# Patient Record
Sex: FEMALE | Race: WHITE | NOT HISPANIC OR LATINO | Employment: FULL TIME | URBAN - METROPOLITAN AREA
[De-identification: names, ages, dates, MRNs, and addresses within clinical notes are randomized per-mention and may not be internally consistent; named-entity substitution may affect disease eponyms.]

---

## 2019-04-21 ENCOUNTER — ANESTHESIA EVENT (OUTPATIENT)
Dept: SURGERY | Facility: HOSPITAL | Age: 54
DRG: 580 | End: 2019-04-21
Payer: COMMERCIAL

## 2019-04-21 ENCOUNTER — HOSPITAL ENCOUNTER (INPATIENT)
Facility: HOSPITAL | Age: 54
LOS: 4 days | Discharge: HOME HEALTH | DRG: 580 | End: 2019-04-25
Attending: EMERGENCY MEDICINE | Admitting: EMERGENCY MEDICINE
Payer: COMMERCIAL

## 2019-04-21 ENCOUNTER — ANESTHESIA (OUTPATIENT)
Dept: SURGERY | Facility: HOSPITAL | Age: 54
DRG: 580 | End: 2019-04-21
Payer: COMMERCIAL

## 2019-04-21 DIAGNOSIS — L03.012 CELLULITIS OF FINGER OF LEFT HAND: ICD-10-CM

## 2019-04-21 DIAGNOSIS — S61.052A CAT BITE OF LEFT THUMB, INITIAL ENCOUNTER: ICD-10-CM

## 2019-04-21 DIAGNOSIS — L02.512 ABSCESS OF LEFT HAND: ICD-10-CM

## 2019-04-21 DIAGNOSIS — M65.9 FLEXOR TENOSYNOVITIS OF THUMB: Primary | ICD-10-CM
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
## 2019-04-21 DIAGNOSIS — L03.90 CELLULITIS, UNSPECIFIED CELLULITIS SITE: ICD-10-CM

## 2019-04-21 LAB
ALBUMIN SERPL BCP-MCNC: 3.9 G/DL (ref 3.5–5.2)
ALP SERPL-CCNC: 68 U/L (ref 55–135)
ALT SERPL W/O P-5'-P-CCNC: 28 U/L (ref 10–44)
ANION GAP SERPL CALC-SCNC: 11 MMOL/L (ref 8–16)
ANION GAP SERPL CALC-SCNC: 13 MMOL/L (ref 8–16)
AST SERPL-CCNC: 32 U/L (ref 10–40)
BASOPHILS # BLD AUTO: 0.05 K/UL (ref 0–0.2)
BASOPHILS # BLD AUTO: 0.05 K/UL (ref 0–0.2)
BASOPHILS NFR BLD: 0.3 % (ref 0–1.9)
BASOPHILS NFR BLD: 0.3 % (ref 0–1.9)
BILIRUB SERPL-MCNC: 0.7 MG/DL (ref 0.1–1)
BUN SERPL-MCNC: 15 MG/DL (ref 6–20)
BUN SERPL-MCNC: 16 MG/DL (ref 6–20)
CALCIUM SERPL-MCNC: 8.9 MG/DL (ref 8.7–10.5)
CALCIUM SERPL-MCNC: 9.5 MG/DL (ref 8.7–10.5)
CHLORIDE SERPL-SCNC: 106 MMOL/L (ref 95–110)
CHLORIDE SERPL-SCNC: 106 MMOL/L (ref 95–110)
CO2 SERPL-SCNC: 22 MMOL/L (ref 23–29)
CO2 SERPL-SCNC: 24 MMOL/L (ref 23–29)
CREAT SERPL-MCNC: 0.8 MG/DL (ref 0.5–1.4)
CREAT SERPL-MCNC: 0.8 MG/DL (ref 0.5–1.4)
CRP SERPL-MCNC: 15.2 MG/L (ref 0–8.2)
DIFFERENTIAL METHOD: ABNORMAL
DIFFERENTIAL METHOD: ABNORMAL
EOSINOPHIL # BLD AUTO: 0.1 K/UL (ref 0–0.5)
EOSINOPHIL # BLD AUTO: 0.2 K/UL (ref 0–0.5)
EOSINOPHIL NFR BLD: 0.5 % (ref 0–8)
EOSINOPHIL NFR BLD: 0.8 % (ref 0–8)
ERYTHROCYTE [DISTWIDTH] IN BLOOD BY AUTOMATED COUNT: 12.3 % (ref 11.5–14.5)
ERYTHROCYTE [DISTWIDTH] IN BLOOD BY AUTOMATED COUNT: 12.5 % (ref 11.5–14.5)
ERYTHROCYTE [SEDIMENTATION RATE] IN BLOOD BY WESTERGREN METHOD: 16 MM/HR (ref 0–36)
EST. GFR  (AFRICAN AMERICAN): >60 ML/MIN/1.73 M^2
EST. GFR  (AFRICAN AMERICAN): >60 ML/MIN/1.73 M^2
EST. GFR  (NON AFRICAN AMERICAN): >60 ML/MIN/1.73 M^2
EST. GFR  (NON AFRICAN AMERICAN): >60 ML/MIN/1.73 M^2
GLUCOSE SERPL-MCNC: 103 MG/DL (ref 70–110)
GLUCOSE SERPL-MCNC: 112 MG/DL (ref 70–110)
GRAM STN SPEC: NORMAL
GRAM STN SPEC: NORMAL
HCT VFR BLD AUTO: 40.1 % (ref 37–48.5)
HCT VFR BLD AUTO: 42.4 % (ref 37–48.5)
HGB BLD-MCNC: 13.2 G/DL (ref 12–16)
HGB BLD-MCNC: 13.8 G/DL (ref 12–16)
IMM GRANULOCYTES # BLD AUTO: 0.08 K/UL (ref 0–0.04)
IMM GRANULOCYTES # BLD AUTO: 0.1 K/UL (ref 0–0.04)
IMM GRANULOCYTES NFR BLD AUTO: 0.4 % (ref 0–0.5)
IMM GRANULOCYTES NFR BLD AUTO: 0.6 % (ref 0–0.5)
LYMPHOCYTES # BLD AUTO: 4.7 K/UL (ref 1–4.8)
LYMPHOCYTES # BLD AUTO: 4.8 K/UL (ref 1–4.8)
LYMPHOCYTES NFR BLD: 26.1 % (ref 18–48)
LYMPHOCYTES NFR BLD: 26.1 % (ref 18–48)
MCH RBC QN AUTO: 30.3 PG (ref 27–31)
MCH RBC QN AUTO: 30.4 PG (ref 27–31)
MCHC RBC AUTO-ENTMCNC: 32.5 G/DL (ref 32–36)
MCHC RBC AUTO-ENTMCNC: 32.9 G/DL (ref 32–36)
MCV RBC AUTO: 92 FL (ref 82–98)
MCV RBC AUTO: 93 FL (ref 82–98)
MONOCYTES # BLD AUTO: 1.1 K/UL (ref 0.3–1)
MONOCYTES # BLD AUTO: 1.3 K/UL (ref 0.3–1)
MONOCYTES NFR BLD: 5.9 % (ref 4–15)
MONOCYTES NFR BLD: 7 % (ref 4–15)
NEUTROPHILS # BLD AUTO: 11.9 K/UL (ref 1.8–7.7)
NEUTROPHILS # BLD AUTO: 12.3 K/UL (ref 1.8–7.7)
NEUTROPHILS NFR BLD: 65.5 % (ref 38–73)
NEUTROPHILS NFR BLD: 66.5 % (ref 38–73)
NRBC BLD-RTO: 0 /100 WBC
NRBC BLD-RTO: 0 /100 WBC
PLATELET # BLD AUTO: 278 K/UL (ref 150–350)
PLATELET # BLD AUTO: 301 K/UL (ref 150–350)
PMV BLD AUTO: 9.5 FL (ref 9.2–12.9)
PMV BLD AUTO: 9.9 FL (ref 9.2–12.9)
POTASSIUM SERPL-SCNC: 3.7 MMOL/L (ref 3.5–5.1)
POTASSIUM SERPL-SCNC: 4 MMOL/L (ref 3.5–5.1)
PROT SERPL-MCNC: 7.3 G/DL (ref 6–8.4)
RBC # BLD AUTO: 4.34 M/UL (ref 4–5.4)
RBC # BLD AUTO: 4.55 M/UL (ref 4–5.4)
SODIUM SERPL-SCNC: 141 MMOL/L (ref 136–145)
SODIUM SERPL-SCNC: 141 MMOL/L (ref 136–145)
WBC # BLD AUTO: 18.08 K/UL (ref 3.9–12.7)
WBC # BLD AUTO: 18.54 K/UL (ref 3.9–12.7)

## 2019-04-21 PROCEDURE — 36415 COLL VENOUS BLD VENIPUNCTURE: CPT

## 2019-04-21 PROCEDURE — 99223 PR INITIAL HOSPITAL CARE,LEVL III: ICD-10-PCS | Mod: ,,, | Performed by: PHYSICIAN ASSISTANT

## 2019-04-21 PROCEDURE — 63600175 PHARM REV CODE 636 W HCPCS: Performed by: PHYSICIAN ASSISTANT

## 2019-04-21 PROCEDURE — 25000003 PHARM REV CODE 250: Performed by: EMERGENCY MEDICINE

## 2019-04-21 PROCEDURE — 64721 CARPAL TUNNEL SURGERY: CPT | Mod: 51,LT,, | Performed by: ORTHOPAEDIC SURGERY

## 2019-04-21 PROCEDURE — 86140 C-REACTIVE PROTEIN: CPT

## 2019-04-21 PROCEDURE — D9220A PRA ANESTHESIA: ICD-10-PCS | Mod: CRNA,,, | Performed by: NURSE ANESTHETIST, CERTIFIED REGISTERED

## 2019-04-21 PROCEDURE — 36000707: Performed by: ORTHOPAEDIC SURGERY

## 2019-04-21 PROCEDURE — 99223 1ST HOSP IP/OBS HIGH 75: CPT | Mod: ,,, | Performed by: PHYSICIAN ASSISTANT

## 2019-04-21 PROCEDURE — 80053 COMPREHEN METABOLIC PANEL: CPT

## 2019-04-21 PROCEDURE — 99291 CRITICAL CARE FIRST HOUR: CPT | Mod: 25

## 2019-04-21 PROCEDURE — 27000221 HC OXYGEN, UP TO 24 HOURS

## 2019-04-21 PROCEDURE — 85025 COMPLETE CBC W/AUTO DIFF WBC: CPT | Mod: 91

## 2019-04-21 PROCEDURE — 85652 RBC SED RATE AUTOMATED: CPT

## 2019-04-21 PROCEDURE — D9220A PRA ANESTHESIA: Mod: ANES,,, | Performed by: ANESTHESIOLOGY

## 2019-04-21 PROCEDURE — D9220A PRA ANESTHESIA: Mod: CRNA,,, | Performed by: NURSE ANESTHETIST, CERTIFIED REGISTERED

## 2019-04-21 PROCEDURE — 87102 FUNGUS ISOLATION CULTURE: CPT

## 2019-04-21 PROCEDURE — 25028 I&D F/ARM&/WRST DP ABSC/HMTM: CPT | Mod: LT,,, | Performed by: ORTHOPAEDIC SURGERY

## 2019-04-21 PROCEDURE — 63600175 PHARM REV CODE 636 W HCPCS: Performed by: NURSE ANESTHETIST, CERTIFIED REGISTERED

## 2019-04-21 PROCEDURE — 37000008 HC ANESTHESIA 1ST 15 MINUTES: Performed by: ORTHOPAEDIC SURGERY

## 2019-04-21 PROCEDURE — 99223 PR INITIAL HOSPITAL CARE,LEVL III: ICD-10-PCS | Mod: ,,, | Performed by: HOSPITALIST

## 2019-04-21 PROCEDURE — 25500020 PHARM REV CODE 255: Performed by: EMERGENCY MEDICINE

## 2019-04-21 PROCEDURE — 64721 PR REVISE MEDIAN N/CARPAL TUNNEL SURG: ICD-10-PCS | Mod: 51,LT,, | Performed by: ORTHOPAEDIC SURGERY

## 2019-04-21 PROCEDURE — 63600175 PHARM REV CODE 636 W HCPCS: Performed by: ANESTHESIOLOGY

## 2019-04-21 PROCEDURE — 71000033 HC RECOVERY, INTIAL HOUR: Performed by: ORTHOPAEDIC SURGERY

## 2019-04-21 PROCEDURE — 71000039 HC RECOVERY, EACH ADD'L HOUR: Performed by: ORTHOPAEDIC SURGERY

## 2019-04-21 PROCEDURE — 99222 1ST HOSP IP/OBS MODERATE 55: CPT | Mod: 57,,, | Performed by: ORTHOPAEDIC SURGERY

## 2019-04-21 PROCEDURE — 96365 THER/PROPH/DIAG IV INF INIT: CPT

## 2019-04-21 PROCEDURE — 26055 PR INCISE FINGER TENDON SHEATH: ICD-10-PCS | Mod: 51,FA,, | Performed by: ORTHOPAEDIC SURGERY

## 2019-04-21 PROCEDURE — A9585 GADOBUTROL INJECTION: HCPCS | Performed by: EMERGENCY MEDICINE

## 2019-04-21 PROCEDURE — 99291 PR CRITICAL CARE, E/M 30-74 MINUTES: ICD-10-PCS | Mod: ,,, | Performed by: EMERGENCY MEDICINE

## 2019-04-21 PROCEDURE — S0028 INJECTION, FAMOTIDINE, 20 MG: HCPCS | Performed by: NURSE ANESTHETIST, CERTIFIED REGISTERED

## 2019-04-21 PROCEDURE — 96375 TX/PRO/DX INJ NEW DRUG ADDON: CPT

## 2019-04-21 PROCEDURE — 36000706: Performed by: ORTHOPAEDIC SURGERY

## 2019-04-21 PROCEDURE — 25000003 PHARM REV CODE 250: Performed by: INTERNAL MEDICINE

## 2019-04-21 PROCEDURE — 94761 N-INVAS EAR/PLS OXIMETRY MLT: CPT

## 2019-04-21 PROCEDURE — 99291 CRITICAL CARE FIRST HOUR: CPT | Mod: ,,, | Performed by: EMERGENCY MEDICINE

## 2019-04-21 PROCEDURE — 26055 INCISE FINGER TENDON SHEATH: CPT | Mod: 51,FA,, | Performed by: ORTHOPAEDIC SURGERY

## 2019-04-21 PROCEDURE — 99222 PR INITIAL HOSPITAL CARE,LEVL II: ICD-10-PCS | Mod: 57,,, | Performed by: ORTHOPAEDIC SURGERY

## 2019-04-21 PROCEDURE — 25000003 PHARM REV CODE 250: Performed by: PHYSICIAN ASSISTANT

## 2019-04-21 PROCEDURE — 63600175 PHARM REV CODE 636 W HCPCS: Performed by: EMERGENCY MEDICINE

## 2019-04-21 PROCEDURE — 80048 BASIC METABOLIC PNL TOTAL CA: CPT

## 2019-04-21 PROCEDURE — 87116 MYCOBACTERIA CULTURE: CPT

## 2019-04-21 PROCEDURE — D9220A PRA ANESTHESIA: ICD-10-PCS | Mod: ANES,,, | Performed by: ANESTHESIOLOGY

## 2019-04-21 PROCEDURE — 37000009 HC ANESTHESIA EA ADD 15 MINS: Performed by: ORTHOPAEDIC SURGERY

## 2019-04-21 PROCEDURE — 87075 CULTR BACTERIA EXCEPT BLOOD: CPT

## 2019-04-21 PROCEDURE — 20600001 HC STEP DOWN PRIVATE ROOM

## 2019-04-21 PROCEDURE — 87205 SMEAR GRAM STAIN: CPT

## 2019-04-21 PROCEDURE — 87206 SMEAR FLUORESCENT/ACID STAI: CPT

## 2019-04-21 PROCEDURE — 25000003 PHARM REV CODE 250: Performed by: NURSE ANESTHETIST, CERTIFIED REGISTERED

## 2019-04-21 PROCEDURE — 87070 CULTURE OTHR SPECIMN AEROBIC: CPT

## 2019-04-21 PROCEDURE — 99223 1ST HOSP IP/OBS HIGH 75: CPT | Mod: ,,, | Performed by: HOSPITALIST

## 2019-04-21 PROCEDURE — 25028 PR INCIS/DRAIN FOREARM DEEP ABSCESS: ICD-10-PCS | Mod: LT,,, | Performed by: ORTHOPAEDIC SURGERY

## 2019-04-21 RX ORDER — DEXAMETHASONE SODIUM PHOSPHATE 4 MG/ML
INJECTION, SOLUTION INTRA-ARTICULAR; INTRALESIONAL; INTRAMUSCULAR; INTRAVENOUS; SOFT TISSUE
Status: DISCONTINUED | OUTPATIENT
Start: 2019-04-21 | End: 2019-04-21

## 2019-04-21 RX ORDER — MIDAZOLAM HYDROCHLORIDE 1 MG/ML
INJECTION, SOLUTION INTRAMUSCULAR; INTRAVENOUS
Status: DISCONTINUED | OUTPATIENT
Start: 2019-04-21 | End: 2019-04-21

## 2019-04-21 RX ORDER — ONDANSETRON 2 MG/ML
INJECTION INTRAMUSCULAR; INTRAVENOUS
Status: DISCONTINUED | OUTPATIENT
Start: 2019-04-21 | End: 2019-04-21

## 2019-04-21 RX ORDER — SODIUM CHLORIDE 9 MG/ML
INJECTION, SOLUTION INTRAVENOUS CONTINUOUS PRN
Status: DISCONTINUED | OUTPATIENT
Start: 2019-04-21 | End: 2019-04-21

## 2019-04-21 RX ORDER — ACETAMINOPHEN 10 MG/ML
INJECTION, SOLUTION INTRAVENOUS
Status: DISCONTINUED | OUTPATIENT
Start: 2019-04-21 | End: 2019-04-21

## 2019-04-21 RX ORDER — ONDANSETRON 8 MG/1
8 TABLET, ORALLY DISINTEGRATING ORAL EVERY 8 HOURS PRN
Status: DISCONTINUED | OUTPATIENT
Start: 2019-04-21 | End: 2019-04-22

## 2019-04-21 RX ORDER — FENTANYL CITRATE 50 UG/ML
25 INJECTION, SOLUTION INTRAMUSCULAR; INTRAVENOUS EVERY 5 MIN PRN
Status: COMPLETED | OUTPATIENT
Start: 2019-04-21 | End: 2019-04-21

## 2019-04-21 RX ORDER — IBUPROFEN 200 MG
24 TABLET ORAL
Status: DISCONTINUED | OUTPATIENT
Start: 2019-04-21 | End: 2019-04-25 | Stop reason: HOSPADM

## 2019-04-21 RX ORDER — RAMELTEON 8 MG/1
8 TABLET ORAL NIGHTLY PRN
Status: DISCONTINUED | OUTPATIENT
Start: 2019-04-21 | End: 2019-04-25 | Stop reason: HOSPADM

## 2019-04-21 RX ORDER — METOCLOPRAMIDE HYDROCHLORIDE 5 MG/ML
INJECTION INTRAMUSCULAR; INTRAVENOUS
Status: DISCONTINUED | OUTPATIENT
Start: 2019-04-21 | End: 2019-04-21

## 2019-04-21 RX ORDER — LIDOCAINE HCL/PF 100 MG/5ML
SYRINGE (ML) INTRAVENOUS
Status: DISCONTINUED | OUTPATIENT
Start: 2019-04-21 | End: 2019-04-21

## 2019-04-21 RX ORDER — AMOXICILLIN 250 MG
1 CAPSULE ORAL 2 TIMES DAILY
Status: DISCONTINUED | OUTPATIENT
Start: 2019-04-21 | End: 2019-04-25 | Stop reason: HOSPADM

## 2019-04-21 RX ORDER — GADOBUTROL 604.72 MG/ML
10 INJECTION INTRAVENOUS
Status: COMPLETED | OUTPATIENT
Start: 2019-04-21 | End: 2019-04-21

## 2019-04-21 RX ORDER — GLUCAGON 1 MG
1 KIT INJECTION
Status: DISCONTINUED | OUTPATIENT
Start: 2019-04-21 | End: 2019-04-25 | Stop reason: HOSPADM

## 2019-04-21 RX ORDER — IPRATROPIUM BROMIDE AND ALBUTEROL SULFATE 2.5; .5 MG/3ML; MG/3ML
3 SOLUTION RESPIRATORY (INHALATION) EVERY 4 HOURS PRN
Status: DISCONTINUED | OUTPATIENT
Start: 2019-04-21 | End: 2019-04-25 | Stop reason: HOSPADM

## 2019-04-21 RX ORDER — METOCLOPRAMIDE HYDROCHLORIDE 5 MG/ML
10 INJECTION INTRAMUSCULAR; INTRAVENOUS EVERY 10 MIN PRN
Status: DISCONTINUED | OUTPATIENT
Start: 2019-04-21 | End: 2019-04-22

## 2019-04-21 RX ORDER — IBUPROFEN 200 MG
16 TABLET ORAL
Status: DISCONTINUED | OUTPATIENT
Start: 2019-04-21 | End: 2019-04-25 | Stop reason: HOSPADM

## 2019-04-21 RX ORDER — SODIUM CHLORIDE 0.9 % (FLUSH) 0.9 %
5 SYRINGE (ML) INJECTION
Status: DISCONTINUED | OUTPATIENT
Start: 2019-04-21 | End: 2019-04-22

## 2019-04-21 RX ORDER — SODIUM CHLORIDE 0.9 % (FLUSH) 0.9 %
10 SYRINGE (ML) INJECTION
Status: DISCONTINUED | OUTPATIENT
Start: 2019-04-21 | End: 2019-04-25 | Stop reason: HOSPADM

## 2019-04-21 RX ORDER — MORPHINE SULFATE ORAL SOLUTION 10 MG/5ML
10 SOLUTION ORAL EVERY 6 HOURS PRN
Status: DISCONTINUED | OUTPATIENT
Start: 2019-04-21 | End: 2019-04-21

## 2019-04-21 RX ORDER — ACETAMINOPHEN 325 MG/1
650 TABLET ORAL EVERY 4 HOURS PRN
Status: DISCONTINUED | OUTPATIENT
Start: 2019-04-21 | End: 2019-04-22

## 2019-04-21 RX ORDER — KETAMINE HYDROCHLORIDE 10 MG/ML
INJECTION, SOLUTION INTRAMUSCULAR; INTRAVENOUS
Status: DISCONTINUED | OUTPATIENT
Start: 2019-04-21 | End: 2019-04-21

## 2019-04-21 RX ORDER — PROPOFOL 10 MG/ML
VIAL (ML) INTRAVENOUS
Status: DISCONTINUED | OUTPATIENT
Start: 2019-04-21 | End: 2019-04-21

## 2019-04-21 RX ORDER — FENTANYL CITRATE 50 UG/ML
INJECTION, SOLUTION INTRAMUSCULAR; INTRAVENOUS
Status: DISCONTINUED | OUTPATIENT
Start: 2019-04-21 | End: 2019-04-21

## 2019-04-21 RX ORDER — KETOROLAC TROMETHAMINE 30 MG/ML
15 INJECTION, SOLUTION INTRAMUSCULAR; INTRAVENOUS EVERY 6 HOURS PRN
Status: DISCONTINUED | OUTPATIENT
Start: 2019-04-21 | End: 2019-04-21

## 2019-04-21 RX ORDER — OXYCODONE HYDROCHLORIDE 5 MG/1
5 TABLET ORAL EVERY 6 HOURS PRN
Status: DISCONTINUED | OUTPATIENT
Start: 2019-04-21 | End: 2019-04-22

## 2019-04-21 RX ORDER — FAMOTIDINE 10 MG/ML
INJECTION INTRAVENOUS
Status: DISCONTINUED | OUTPATIENT
Start: 2019-04-21 | End: 2019-04-21

## 2019-04-21 RX ORDER — MEPERIDINE HYDROCHLORIDE 50 MG/ML
12.5 INJECTION INTRAMUSCULAR; INTRAVENOUS; SUBCUTANEOUS EVERY 10 MIN PRN
Status: DISCONTINUED | OUTPATIENT
Start: 2019-04-21 | End: 2019-04-21

## 2019-04-21 RX ORDER — MORPHINE SULFATE 4 MG/ML
8 INJECTION, SOLUTION INTRAMUSCULAR; INTRAVENOUS
Status: COMPLETED | OUTPATIENT
Start: 2019-04-21 | End: 2019-04-21

## 2019-04-21 RX ORDER — PROMETHAZINE HYDROCHLORIDE 12.5 MG/1
25 TABLET ORAL EVERY 6 HOURS PRN
Status: DISCONTINUED | OUTPATIENT
Start: 2019-04-21 | End: 2019-04-22

## 2019-04-21 RX ORDER — HYDROMORPHONE HYDROCHLORIDE 1 MG/ML
0.2 INJECTION, SOLUTION INTRAMUSCULAR; INTRAVENOUS; SUBCUTANEOUS EVERY 5 MIN PRN
Status: DISCONTINUED | OUTPATIENT
Start: 2019-04-21 | End: 2019-04-22

## 2019-04-21 RX ADMIN — DEXAMETHASONE SODIUM PHOSPHATE 8 MG: 4 INJECTION, SOLUTION INTRAMUSCULAR; INTRAVENOUS at 08:04

## 2019-04-21 RX ADMIN — HYDROMORPHONE HYDROCHLORIDE 0.2 MG: 1 INJECTION, SOLUTION INTRAMUSCULAR; INTRAVENOUS; SUBCUTANEOUS at 10:04

## 2019-04-21 RX ADMIN — AMPICILLIN SODIUM AND SULBACTAM SODIUM 3 G: 2; 1 INJECTION, POWDER, FOR SOLUTION INTRAMUSCULAR; INTRAVENOUS at 02:04

## 2019-04-21 RX ADMIN — ACETAMINOPHEN 650 MG: 325 TABLET ORAL at 04:04

## 2019-04-21 RX ADMIN — FENTANYL CITRATE 25 MCG: 50 INJECTION INTRAMUSCULAR; INTRAVENOUS at 10:04

## 2019-04-21 RX ADMIN — RAMELTEON 8 MG: 8 TABLET, FILM COATED ORAL at 08:04

## 2019-04-21 RX ADMIN — LIDOCAINE HYDROCHLORIDE 100 MG: 20 INJECTION, SOLUTION INTRAVENOUS at 08:04

## 2019-04-21 RX ADMIN — AMPICILLIN SODIUM AND SULBACTAM SODIUM 3 G: 2; 1 INJECTION, POWDER, FOR SOLUTION INTRAMUSCULAR; INTRAVENOUS at 08:04

## 2019-04-21 RX ADMIN — OXYCODONE HYDROCHLORIDE 5 MG: 5 TABLET ORAL at 09:04

## 2019-04-21 RX ADMIN — SENNOSIDES AND DOCUSATE SODIUM 1 TABLET: 8.6; 5 TABLET ORAL at 08:04

## 2019-04-21 RX ADMIN — KETAMINE HYDROCHLORIDE 20 MG: 10 INJECTION, SOLUTION INTRAMUSCULAR; INTRAVENOUS at 08:04

## 2019-04-21 RX ADMIN — ACETAMINOPHEN 650 MG: 325 TABLET ORAL at 11:04

## 2019-04-21 RX ADMIN — FENTANYL CITRATE 50 MCG: 50 INJECTION, SOLUTION INTRAMUSCULAR; INTRAVENOUS at 08:04

## 2019-04-21 RX ADMIN — ONDANSETRON 4 MG: 2 INJECTION INTRAMUSCULAR; INTRAVENOUS at 08:04

## 2019-04-21 RX ADMIN — GADOBUTROL 10 ML: 604.72 INJECTION INTRAVENOUS at 04:04

## 2019-04-21 RX ADMIN — ONDANSETRON 8 MG: 8 TABLET, ORALLY DISINTEGRATING ORAL at 12:04

## 2019-04-21 RX ADMIN — HYDROMORPHONE HYDROCHLORIDE 0.2 MG: 1 INJECTION, SOLUTION INTRAMUSCULAR; INTRAVENOUS; SUBCUTANEOUS at 07:04

## 2019-04-21 RX ADMIN — MIDAZOLAM HYDROCHLORIDE 2 MG: 1 INJECTION, SOLUTION INTRAMUSCULAR; INTRAVENOUS at 08:04

## 2019-04-21 RX ADMIN — MORPHINE SULFATE 10 MG: 10 SOLUTION ORAL at 12:04

## 2019-04-21 RX ADMIN — AMPICILLIN SODIUM AND SULBACTAM SODIUM 3 G: 2; 1 INJECTION, POWDER, FOR SOLUTION INTRAMUSCULAR; INTRAVENOUS at 01:04

## 2019-04-21 RX ADMIN — SODIUM CHLORIDE: 0.9 INJECTION, SOLUTION INTRAVENOUS at 08:04

## 2019-04-21 RX ADMIN — FENTANYL CITRATE 25 MCG: 50 INJECTION INTRAMUSCULAR; INTRAVENOUS at 09:04

## 2019-04-21 RX ADMIN — OXYCODONE HYDROCHLORIDE 5 MG: 5 TABLET ORAL at 04:04

## 2019-04-21 RX ADMIN — PROPOFOL 150 MG: 10 INJECTION, EMULSION INTRAVENOUS at 08:04

## 2019-04-21 RX ADMIN — SENNOSIDES AND DOCUSATE SODIUM 1 TABLET: 8.6; 5 TABLET ORAL at 10:04

## 2019-04-21 RX ADMIN — ACETAMINOPHEN 1000 MG: 10 INJECTION, SOLUTION INTRAVENOUS at 08:04

## 2019-04-21 RX ADMIN — MORPHINE SULFATE 10 MG: 10 SOLUTION ORAL at 05:04

## 2019-04-21 RX ADMIN — FAMOTIDINE 20 MG: 10 INJECTION, SOLUTION INTRAVENOUS at 08:04

## 2019-04-21 RX ADMIN — MORPHINE SULFATE 8 MG: 4 INJECTION INTRAVENOUS at 01:04

## 2019-04-21 RX ADMIN — FENTANYL CITRATE 50 MCG: 50 INJECTION, SOLUTION INTRAMUSCULAR; INTRAVENOUS at 09:04

## 2019-04-21 RX ADMIN — METOCLOPRAMIDE 5 MG: 5 INJECTION, SOLUTION INTRAMUSCULAR; INTRAVENOUS at 08:04

## 2019-04-21 NOTE — NURSING TRANSFER
Nursing Transfer Note      4/21/2019     Transfer To: 729    Transfer via bed    Transfer with IV ABX, IV pole    Transported by transport      Chart send with patient: Yes    Notified: n/a    Patient reassessed at: 4/21/19

## 2019-04-21 NOTE — ASSESSMENT & PLAN NOTE
Geneva Rodriguez is a 53 y.o. female with left hand abscess s/p cat bite. MRI showing large fluid collection in hand.    - To OR today for incision and drainage. Marked and consented. NPO. Continue IVAB.    The risks, benefits and alternatives to surgery were discussed with the patient at great length.  These include bleeding, infection, vessel/nerve damage, pain, numbness, tingling, complex regional pain syndrome, recurrent infection need for further surgery, cartilage damage, DVT, PE, arthritis and death.  Patient states an understanding and wishes to proceed with surgery.   All questions were answered.  No guarantees were implied or stated.  Informed consent was obtained.

## 2019-04-21 NOTE — ANESTHESIA POSTPROCEDURE EVALUATION
Anesthesia Post Evaluation    Patient: Geneva Rodriguez    Procedure(s) Performed: Procedure(s) (LRB):  Incision and Drainage Hand (Left)  RELEASE, CARPAL TUNNEL (Left)  RELEASE, TRIGGER FINGER, THUMB    Final Anesthesia Type: general  Patient location during evaluation: PACU  Patient participation: Yes- Able to Participate  Level of consciousness: awake and alert  Post-procedure vital signs: reviewed and stable  Pain management: pain needs to be addressed  Airway patency: patent  PONV status at discharge: No PONV  Anesthetic complications: no      Cardiovascular status: blood pressure returned to baseline  Respiratory status: unassisted  Hydration status: euvolemic  Follow-up not needed.          Vitals Value Taken Time   /58 4/21/2019 10:16 AM   Temp 36.4 °C (97.5 °F) 4/21/2019  9:35 AM   Pulse 104 4/21/2019 10:17 AM   Resp 20 4/21/2019 10:17 AM   SpO2 89 % 4/21/2019 10:17 AM   Vitals shown include unvalidated device data.      No case tracking events are documented in the log.      Pain/Madi Score: Pain Rating Prior to Med Admin: 6 (4/21/2019 10:15 AM)  Pain Rating Post Med Admin: 6 (4/21/2019 10:15 AM)

## 2019-04-21 NOTE — PLAN OF CARE
Problem: Adult Inpatient Plan of Care  Goal: Plan of Care Review  Outcome: Ongoing (interventions implemented as appropriate)  Plan of care reviewed with liangnet. Pt verbalized understanding of plan of care. Patient AAOX4, VSS. Pt NPO. Pain management maintained. Consents signed for surgery, scheduled for 0730.  Bed locked and low, patient belongings and call light within reach. Will continue to monitor.

## 2019-04-21 NOTE — OP NOTE
DATE OF PROCEDURE:  04/21/2019.    PROCEDURES:  1.  Irrigation and debridement, left hand and thumb.  2.  Carpal tunnel release, left wrist.  3.  A1 pulley release, left thumb.    PRIMARY SURGEON:  Emiliano Martínez M.D.    ASSISTANT SURGEONS:  Niraj Amato M.D. (RES); Dr. Guido Bull; and Dr. Guido Rdz.    PREOPERATIVE DIAGNOSES:  1.  First webspace infection, left hand.  2.  Acute carpal tunnel with abscess, left wrist.  3.  Cat bite, left thumb.    POSTOPERATIVE DIAGNOSES:  1.  First webspace infection, left hand.  2.  Acute carpal tunnel with abscess, left wrist.  3.  Cat bite, left thumb.    ANESTHESIA:  General.    INDICATIONS FOR PROCEDURE:  Ms. Rodriguez is a 53-year-old female who sustained a   cat bite to her left thumb two days ago.  She was seen in the Emergency   Department this morning with signs and symptoms consistent with flexor   tenosynovitis.  An MRI was performed showing a large fluid collection within the   carpal tunnel as well as the flexor tendon sheath of the thumb.  Treatment   options were explained in great detail with the patient.  We elected to proceed   with emergent I and D with exploration.  All consents were signed.  No   guarantees were made.    PROCEDURE IN DETAIL:  On the date of the procedure, the patient arrived to the   preoperative area where consents were verified and surgical site was marked.    She was then taken to the Operative Suite and placed under general anesthesia in   the supine position with all bony prominences well-padded.  A tourniquet was   applied to the left upper arm.  Preoperative antibiotics were then held in   anticipation of cultures.  Timeout was performed verifying the procedure.  All   agreed and we elected to proceed.  The left upper extremity was then prepped and   draped in a sterile fashion.  We began by making a standard carpal tunnel   incision using Boyd's cardinal lines.  The palmaris fascia was swept out of   the way and the  transverse carpal ligament was incised in line with the   incision.  The transverse carpal ligament was then cut using Metzenbaum scissors   proximally and distally and the median nerve was retracted out of harm's way,   and at this time, we found a large pocket of purulent fluid within the flexor   tendon sheath in the deep carpal tunnel compartment.  This was irrigated with   copious amounts of normal saline using an Angiocath and bulb irrigation.  We   then made a standard incision over the proximal palmar crease of the left thumb.    The sensory nerve in the radial sensory branch was then retracted out of   harm's way and the A1 pulley was incised longitudinally.  There was a minimal   amount of fluid within the sheath.  This again was irrigated with an Angiocath.    The wounds were then closed using 3-0 nylon in an interrupted fashion and the   patient was placed in a sterile dressing and a volar slab splint.  The patient   tolerated the procedure well without any complications.    ESTIMATED BLOOD LOSS:  5 mL.    IMPLANTS:  None.    SPECIMENS:  Wound culture sent for culture.    CONDITION:  Stable.    DISPOSITION:  The patient will be remaining on IV antibiotics with Pasteurella   coverage.  She understands that she may need a repeat washout.  Dr. Emiliano Martínez was present for all critical aspects of the procedure.    DICTATED BY:  Niraj Amato M.D. (RES).      MARIANNE/WON  dd: 04/21/2019 09:27:48 (CDT)  td: 04/21/2019 12:43:40 (CDT)  Doc ID   #[id number]  Job ID #[id number]    CC:

## 2019-04-21 NOTE — ANESTHESIA PREPROCEDURE EVALUATION
[facility]  Anesthesia Pre-Operative Evaluation         Patient Name: Geneva Rodriguez  YOB: 1965  MRN: [id number]    SUBJECTIVE:     Pre-operative evaluation for Procedure(s) (LRB):  Incision and Drainage. Hand. Hand table, hand set. 3L NS. (Left)     04/21/2019    Geneva Rodriguez is a 53 y.o. female without significant PMH is being admitted from ED with cat bite cellulitis of left thumb. She is afebrile, but has leukocytosis and elevated CRP. MRI showing large amount of fluid in hand concerning for abscess.    Patient now presents for the above procedure(s).      LDA:   22G PIV R hand     Prev airway: None documented.    Drips: None documented.      Patient Active Problem List   Diagnosis    Cellulitis    Cat bite of left thumb       Review of patient's allergies indicates:  No Known Allergies    Current Inpatient Medications:   ampicillin-sulbactim (UNASYN) IVPB  3 g Intravenous Q6H    senna-docusate 8.6-50 mg  1 tablet Oral BID       No current facility-administered medications on file prior to encounter.      No current outpatient medications on file prior to encounter.       History reviewed. No pertinent surgical history.    Social History     Socioeconomic History    Marital status: Single     Spouse name: Not on file    Number of children: Not on file    Years of education: Not on file    Highest education level: Not on file   Occupational History    Not on file   Social Needs    Financial resource strain: Not on file    Food insecurity:     Worry: Not on file     Inability: Not on file    Transportation needs:     Medical: Not on file     Non-medical: Not on file   Tobacco Use    Smoking status: Never Smoker   Substance and Sexual Activity    Alcohol use: Not on file    Drug use: Not on file    Sexual activity: Not on file   Lifestyle    Physical activity:     Days per week: Not on file     Minutes per session: Not on file    Stress: Not on file    Relationships    Social connections:     Talks on phone: Not on file     Gets together: Not on file     Attends [employer] service: Not on file     Active member of club or organization: Not on file     Attends meetings of clubs or organizations: Not on file     Relationship status: Not on file   Other Topics Concern    Not on file   Social History Narrative    Not on file       OBJECTIVE:     Vital Signs Range (Last 24H):  Temp:  [36.2 °C (97.2 °F)-36.9 °C (98.4 °F)]   Pulse:  []   Resp:  [16-20]   BP: (118-138)/(60-79)   SpO2:  [95 %-99 %]       Significant Labs:  Lab Results   Component Value Date    WBC 18.08 (H) 04/21/2019    HGB 13.2 04/21/2019    HCT 40.1 04/21/2019     04/21/2019    ALT 28 04/21/2019    AST 32 04/21/2019     04/21/2019    K 3.7 04/21/2019     04/21/2019    CREATININE 0.8 04/21/2019    BUN 16 04/21/2019    CO2 24 04/21/2019       Diagnostic Studies: No relevant studies.    EKG: No recent studies available.    2D ECHO:  No results found for this or any previous visit.      ASSESSMENT/PLAN:         Anesthesia Evaluation    I have reviewed the Patient Summary Reports.     I have reviewed the Medications.     Review of Systems  Anesthesia Hx:  No previous Anesthesia  Neg history of prior surgery. Denies Family Hx of Anesthesia complications.    Social:  Non-Smoker    Cardiovascular:   Denies Hypertension.  Denies CAD.       Pulmonary:   Denies COPD.  Denies Asthma.  Denies Recent URI.    Renal/:   Denies Chronic Renal Disease.     Hepatic/GI:   Denies GERD.    Endocrine:   Denies Diabetes.        Physical Exam  General:  Well nourished, Obesity    Airway/Jaw/Neck:  Airway Findings: Mouth Opening: Small, but > 3cm Tongue: Normal  General Airway Assessment: Adult  Mallampati: III  Improves to II with phonation.  TM Distance: 4 - 6 cm  Jaw/Neck Findings:  Neck ROM: Normal ROM  Neck Findings:  Short Neck      Dental:  Dental Findings: In tact     Chest/Lungs:  Chest/Lungs Findings: Clear to auscultation, Normal Respiratory Rate     Heart/Vascular:  Heart Findings: Rate: Normal  Rhythm: Regular Rhythm  Sounds: Normal        Mental Status:  Mental Status Findings:  Cooperative, Alert and Oriented         Anesthesia Plan  Type of Anesthesia, risks & benefits discussed:  Anesthesia Type:  general  Patient's Preference:   Intra-op Monitoring Plan: standard ASA monitors  Intra-op Monitoring Plan Comments:   Post Op Pain Control Plan: per primary service following discharge from PACU, IV/PO Opioids PRN and multimodal analgesia  Post Op Pain Control Plan Comments:   Induction:   IV  Beta Blocker:  Patient is not currently on a Beta-Blocker (No further documentation required).       Informed Consent: Patient understands risks and agrees with Anesthesia plan.  Questions answered. Anesthesia consent signed with patient.  ASA Score: 1  emergent   Day of Surgery Review of History & Physical:    H&P update referred to the provider.         Ready For Surgery From Anesthesia Perspective.

## 2019-04-21 NOTE — ANESTHESIA RELEASE NOTE
"Anesthesia Release from PACU Note    Patient: Geneva Rodriguez    Procedure(s) Performed: Procedure(s) (LRB):  Incision and Drainage Hand (Left)  RELEASE, CARPAL TUNNEL (Left)  RELEASE, TRIGGER FINGER, THUMB    Anesthesia type: general    Post pain: Pain needs to be addressed    Post assessment: no apparent anesthetic complications    Last Vitals:   Visit Vitals  /65   Pulse 100   Temp 36.4 °C (97.5 °F) (Temporal)   Resp (!) 22   Ht 5' 4" (1.626 m)   Wt 102.1 kg (225 lb)   SpO2 95%   Breastfeeding? No   BMI 38.62 kg/m²       Post vital signs: stable    Level of consciousness: awake    Nausea/Vomiting: no nausea/no vomiting    Complications: none    Airway Patency: patent    Respiratory: unassisted    Cardiovascular: stable and blood pressure at baseline    Hydration: euvolemic  "

## 2019-04-21 NOTE — ED PROVIDER NOTES
Encounter Date: 4/21/2019    SCRIBE #1 NOTE: I, Jena Chiu, am scribing for, and in the presence of,  Dr. Rose. I have scribed the entire note.       History     Chief Complaint   Patient presents with    Animal Bite     reports being bit by cat tonight, bite pradip to left thumb, last tetanus unknown      Time patient was seen by the provider: 12:50 AM      Mrs. Rodriguez is a 53 y.o. female who presents to the ED with a complaint of a cat bite with left thumb pain. She reports that she she was putting the cat down when it bit her on the thumb of her left hand on Friday. Since then, she has had worsening pain and swelling to left thumb and fingers. Works here and was told by coworkers to come to the ED for evaluation. Patient states that she is in severe pain that radiates up forearm. Pain worsens with movement of thumb. She notes that she took aleve for the pain yesterday evening, which helped some. She denies any drainage from the bite, fevers, chills, n/v, abd pain, chest pain, SOB, numbness, tingling, weakness.    The history is provided by the patient.     Review of patient's allergies indicates:  No Known Allergies  Past Medical History:   Diagnosis Date    Ruptured spleen      Past Surgical History:   Procedure Laterality Date    Incision and Drainage Hand Left 4/21/2019    Performed by Emiliano [facility] MD at [facility] OR 2ND FLR    RELEASE, CARPAL TUNNEL Left 4/21/2019    Performed by Emiliano Mart[facility]nez MD at [facility] OR 2ND FLR    RELEASE, TRIGGER FINGER, THUMB  4/21/2019    Performed by Emiliano Mart[facility]nez MD at [facility] OR [facility]     History reviewed. No pertinent family history.  Social History     Tobacco Use    Smoking status: Never Smoker   Substance Use Topics    Alcohol use: Not on file    Drug use: Not on file     Review of Systems   Constitutional: Negative for fever.   HENT: Negative for sore throat.    Eyes: Negative for visual disturbance.   Respiratory: Negative for shortness of breath.     Cardiovascular: Negative for chest pain.   Gastrointestinal: Negative for nausea.   Genitourinary: Negative for dysuria.   Musculoskeletal:        Pain to the left hand, wrist, and arm.   Skin: Negative for rash.   Neurological: Negative for weakness.       Physical Exam     Initial Vitals [04/21/19 0040]   BP Pulse Resp Temp SpO2   138/65 106 16 98.4 °F (36.9 °C) 97 %      MAP       --         Physical Exam    Nursing note and vitals reviewed.  Constitutional: She appears well-developed and well-nourished. She is not diaphoretic. No distress.   HENT:   Head: Normocephalic and atraumatic.   Right Ear: External ear normal.   Left Ear: External ear normal.   Neck: Neck supple.   Cardiovascular: Normal rate, regular rhythm, normal heart sounds and intact distal pulses.   Pulmonary/Chest: Breath sounds normal. No respiratory distress. She has no wheezes. She has no rhonchi. She has no rales.   Abdominal: Soft. She exhibits no distension. There is no tenderness.   Musculoskeletal:        Hands:  Puncture pradip on the inside of the left thumb with tenderness and erythema along the flexor surface across thenar eminence of the thumb.    Neurological: She is alert and oriented to person, place, and time. GCS score is 15. GCS eye subscore is 4. GCS verbal subscore is 5. GCS motor subscore is 6.   Skin: Skin is warm. Capillary refill takes less than 2 seconds. No rash noted.   Psychiatric: She has a normal mood and affect.         ED Course   Procedures  Labs Reviewed   CBC W/ AUTO DIFFERENTIAL - Abnormal; Notable for the following components:       Result Value    WBC 18.54 (*)     Gran # (ANC) 12.3 (*)     Immature Grans (Abs) 0.08 (*)     Mono # 1.1 (*)     All other components within normal limits   C-REACTIVE PROTEIN - Abnormal; Notable for the following components:    CRP 15.2 (*)     All other components within normal limits   BASIC METABOLIC PANEL - Abnormal; Notable for the following components:    CO2 22 (*)      Glucose 112 (*)     All other components within normal limits   SEDIMENTATION RATE          Imaging Results          X-Ray Hand 3 View Left (Final result)  Result time 04/21/19 01:53:36    Final result by Neri Powell MD (04/21/19 01:53:36)                 Impression:      No acute fracture or radiopaque foreign body.      Electronically signed by: Neri Powell MD  Date:    04/21/2019  Time:    01:53             Narrative:    EXAMINATION:  XR HAND COMPLETE 3 VIEW LEFT    CLINICAL HISTORY:  cat bite;.    TECHNIQUE:  PA, lateral, and oblique views of the left hand were performed.    COMPARISON:  None    FINDINGS:  No acute fracture or dislocation.  Soft tissues are symmetric.  No radiopaque foreign body.                                 Medical Decision Making:   History:   Old Medical Records: I decided to obtain old medical records.  Old Records Summarized: other records.  Independently Interpreted Test(s):   I have ordered and independently interpreted X-rays - see summary below.       <> Summary of X-Ray Reading(s): XR w/o acute fx or dislocation on my read.  Clinical Tests:   Lab Tests: Ordered and Reviewed  Radiological Study: Ordered and Reviewed  ED Management:  Mildly tachycardic. Afebrile. Has 3/4 knavel signs (no real fusiform swelling, just mild edema). Clinical concern for flexor tenosynovitis. Unasyn given. CBC, BMP, ESR, CRP obtained. XR obtained. Ortho consulted. Pain meds given.    Labs significant for WBC 18.5, normal ESR, mildly elevated CRP.    Signed out to Dr. Cruz at shift change to f/u ortho dispo and overall dispo. I believe this patient should likely be admitted.  Other:   I have discussed this case with another health care provider.       <> Summary of the Discussion: Orthopedic surgery, oncoming ED physician.            Scribe Attestation:   Scribe #1: I performed the above scribed service and the documentation accurately describes the services I performed. I attest to the  accuracy of the note.    Attending Attestation:         Attending Critical Care:   Critical Care Times:   ==============================================================  · Total Critical Care Time - exclusive of procedural time: 30 minutes.  ==============================================================  Critical care reasons: flexor tenosynovitis.   Critical care was time spent personally by me on the following activities: obtaining history from patient or relative, examination of patient, review of old charts, ordering lab, x-rays, and/or EKG, development of treatment plan with patient or relative, ordering and performing treatments and interventions, evaluation of patient's response to treatment, discussion with consultants, interpretation of cardiac measurements and re-evaluation of patient's conition.   [facility] ED MDM PLUS CC 4: threat to permanent disability to hand or limb.                  Clinical Impression:       ICD-10-CM ICD-9-CM   1. Flexor tenosynovitis of thumb M65.9 727.05   2. Cellulitis, unspecified cellulitis site L03.90 682.9   3. Cellulitis of finger of left hand L03.012 681.00   4. Cat bite of left thumb, initial encounter S61.052A 883.0    W55.01XA E906.3   5. Abscess of left hand L02.512 682.4         Disposition:   Disposition: Admitted  Condition: Stable                        Jamir Rose MD  04/22/19 [id number]

## 2019-04-21 NOTE — SUBJECTIVE & OBJECTIVE
"Principal Problem:Cellulitis    Principal Orthopedic Problem: same    Interval History: Patient seen and examined at bedside.   No acute events overnight.  Pain controlled.  Reports continued pain, no resolution of sx with IVAB. MRI showing large amount of fluid in hand concerning for abscess.      Review of patient's allergies indicates:  No Known Allergies    Current Facility-Administered Medications   Medication    acetaminophen tablet 650 mg    albuterol-ipratropium 2.5 mg-0.5 mg/3 mL nebulizer solution 3 mL    ampicillin-sulbactam 3 g in sodium chloride 0.9 % 100 mL IVPB (ready to mix system)    dextrose 50% injection 12.5 g    dextrose 50% injection 25 g    glucagon (human recombinant) injection 1 mg    glucose chewable tablet 16 g    glucose chewable tablet 24 g    ketorolac injection 15 mg    morphine 10 mg/5 mL solution 10 mg    ondansetron disintegrating tablet 8 mg    promethazine tablet 25 mg    ramelteon tablet 8 mg    senna-docusate 8.6-50 mg per tablet 1 tablet    sodium chloride 0.9% flush 10 mL    sodium chloride 0.9% flush 5 mL     Objective:     Vital Signs (Most Recent):  Temp: 98 °F (36.7 °C) (04/21/19 0605)  Pulse: 98 (04/21/19 0605)  Resp: 20 (04/21/19 0605)  BP: 123/76 (04/21/19 0605)  SpO2: 95 % (04/21/19 0605) Vital Signs (24h Range):  Temp:  [98 °F (36.7 °C)-98.4 °F (36.9 °C)] 98 °F (36.7 °C)  Pulse:  [] 98  Resp:  [16-20] 20  SpO2:  [95 %-99 %] 95 %  BP: (118-138)/(60-76) 123/76     Weight: 102.1 kg (225 lb)  Height: 5' 4" (162.6 cm)  Body mass index is 38.62 kg/m².    No intake or output data in the 24 hours ending 04/21/19 0625    Ortho/SPM Exam  Physical Exam:  NAD, A/O x 3.  No focal motor or sensory deficits noted.      Significant Labs: All pertinent labs within the past 24 hours have been reviewed.    Significant Imaging: I have reviewed and interpreted all pertinent imaging results/findings.  "

## 2019-04-21 NOTE — HPI
Patient is a 53 year old lady with no significant medical history.  She presents with left thumb pain following a cat bite on Friday.  Patient states that she was attempting to give medication to a  kitten when it bit her left thumb.  Since that time, she has noted increased pain and swelling to left thumb and fingers.  Pain radiates up her forearm.  It is worse with movement.  She took Aleve, which provided mild improvement.  She states she is otherwise in her usual state of health and denies chest pain, SOB, dizziness, palpitations, fever/chills, N/V/D, numbness, tingling.  Only past medical history is a ruptured spleen in the .  She does not smoke.

## 2019-04-21 NOTE — SUBJECTIVE & OBJECTIVE
History reviewed. No pertinent past medical history.    History reviewed. No pertinent surgical history.    Review of patient's allergies indicates:  No Known Allergies    No current facility-administered medications on file prior to encounter.      No current outpatient medications on file prior to encounter.     Family History     None        Tobacco Use    Smoking status: Not on file   Substance and Sexual Activity    Alcohol use: Not on file    Drug use: Not on file    Sexual activity: Not on file     Review of Systems   Constitutional: Negative for activity change, appetite change, chills, fatigue, fever and unexpected weight change.   HENT: Negative for congestion, rhinorrhea, sore throat, trouble swallowing and voice change.    Eyes: Negative for visual disturbance.   Respiratory: Negative for cough, choking, chest tightness, shortness of breath and wheezing.    Cardiovascular: Negative for chest pain, palpitations and leg swelling.   Gastrointestinal: Negative for abdominal distention, abdominal pain, anal bleeding, blood in stool, constipation, diarrhea, nausea and vomiting.   Endocrine: Negative for cold intolerance, heat intolerance, polydipsia and polyuria.   Genitourinary: Negative for dysuria, flank pain, frequency, hematuria and urgency.   Musculoskeletal: Negative for arthralgias, back pain, joint swelling and myalgias.        Pain and swelling to L thumb   Skin: Negative for color change and rash.   Neurological: Negative for dizziness, seizures, syncope, facial asymmetry, speech difficulty, weakness, light-headedness, numbness and headaches.   Hematological: Negative for adenopathy. Does not bruise/bleed easily.   Psychiatric/Behavioral: Negative for agitation, confusion, hallucinations and suicidal ideas.     Objective:     Vital Signs (Most Recent):  Temp: 98 °F (36.7 °C) (04/21/19 0332)  Pulse: 86 (04/21/19 0332)  Resp: 20 (04/21/19 0332)  BP: 118/60 (04/21/19 0332)  SpO2: 99 % (04/21/19  0332) Vital Signs (24h Range):  Temp:  [98 °F (36.7 °C)-98.4 °F (36.9 °C)] 98 °F (36.7 °C)  Pulse:  [] 86  Resp:  [16-20] 20  SpO2:  [97 %-99 %] 99 %  BP: (118-138)/(60-65) 118/60     Weight: 102.1 kg (225 lb)  Body mass index is 38.62 kg/m².    Physical Exam   Constitutional: She is oriented to person, place, and time. She appears well-developed and well-nourished. No distress.   HENT:   Head: Normocephalic and atraumatic.   Eyes: Pupils are equal, round, and reactive to light.   Neck: Neck supple. Carotid bruit is not present. No thyromegaly present.   Cardiovascular: Normal rate and regular rhythm. Exam reveals no gallop.   No murmur heard.  Pulmonary/Chest: Effort normal and breath sounds normal. No respiratory distress. She has no wheezes.   Abdominal: Soft. Bowel sounds are normal. She exhibits no distension and no mass. There is no splenomegaly or hepatomegaly. There is no tenderness.   Musculoskeletal: Normal range of motion. She exhibits no edema or deformity.   Bandage to L hand C/D/I   Neurological: She is alert and oriented to person, place, and time. No cranial nerve deficit or sensory deficit.   Skin: Skin is warm and dry. No rash noted.   Psychiatric: She has a normal mood and affect.         CRANIAL NERVES     CN III, IV, VI   Pupils are equal, round, and reactive to light.       Significant Labs:   CBC:   Recent Labs   Lab 04/21/19  0052   WBC 18.54*   HGB 13.8   HCT 42.4        CMP:   Recent Labs   Lab 04/21/19  0052      K 4.0      CO2 22*   *   BUN 15   CREATININE 0.8   CALCIUM 8.9   ANIONGAP 13   EGFRNONAA >60.0       Significant Imaging: I have reviewed all pertinent imaging results/findings within the past 24 hours.

## 2019-04-21 NOTE — CONSULTS
Consult Note  Orthopaedics    SUBJECTIVE:     History of Present Illness:  Mrs. Rodriguez is a 53 y.o. female who presents to the ED with a complaint of a cat bite with left thumb pain. She reports that she she was putting the cat down when it bit her on the thumb of her left hand on Friday. Since then, she has had worsening pain and swelling to left thumb and fingers. Works here and was told by coworkers to come to the ED for evaluation. Patient states that she is in severe pain that radiates up forearm. Pain worsens with movement of thumb. She notes that she took aleve for the pain yesterday evening, which helped some. She denies any drainage from the bite, fevers, chills, n/v, abd pain, chest pain, SOB, numbness, tingling, weakness.        Scheduled Meds:  Continuous Infusions:  PRN Meds:sodium chloride 0.9%    Review of patient's allergies indicates:  No Known Allergies    History reviewed. No pertinent past medical history.  History reviewed. No pertinent surgical history.  History reviewed. No pertinent family history.  Social History     Tobacco Use    Smoking status: Not on file   Substance Use Topics    Alcohol use: Not on file    Drug use: Not on file        Review of Systems:  Constitutional: negative for fevers  Eyes: no visual changes  ENT: negative for hearing loss  Respiratory: negative for dyspnea  Cardiovascular: negative for chest pain  Gastrointestinal: negative for abdominal pain  Genitourinary: negative for dysuria  Neurological: negative for headaches  Behavioral/Psych: negative for hallucinations  Endocrine: negative for temperature intolerance      OBJECTIVE:     Vital Signs (Most Recent)  Temp: 98 °F (36.7 °C) (04/21/19 0332)  Pulse: 86 (04/21/19 0332)  Resp: 20 (04/21/19 0332)  BP: 118/60 (04/21/19 0332)  SpO2: 99 % (04/21/19 0332)    Physical Exam:  Gen:  No acute distress  CV:  Peripherally well-perfused.  Pulses 2+ bilaterally.  Lungs:  Normal respiratory effort.  Abdomen:  Soft,  non-tender, non-distended  Head/Neck:  Normocephalic.  Atraumatic. No TTP, AROM and PROM intact without pain  Neuro:  CN intact without deficit, SILT throughout B/L Upper & Lower Extremities  Pelvis: No TTP, Stable to direct anterior pressure over ASIS.    LEFT UPPER EXTREMITY:     INSPECTION  - Erythema and cat bite to palmar aspect of thumb, minimal swelling.   PALPATION  - [place] TTP over flexor tendon  RANGE OF MOTION  - AROM and PROM intact at all joint. Pain with ROM of index and middle finger and less so the thumb.   NEUROVASCULAR  - AIN/PIN/Radial/Median/Ulnar Nerves assessed in isolation without deficit  - SILT throughout  - Radial & Ulnar arteries palpated 2+  - Capillary Refill <3s    Laboratory:  Recent Results (from the past 72 hour(s))   CBC auto differential    Collection Time: 04/21/19 12:52 AM   Result Value Ref Range    WBC 18.54 (H) 3.90 - 12.70 K/uL    RBC 4.55 4.00 - 5.40 M/uL    Hemoglobin 13.8 12.0 - 16.0 g/dL    Hematocrit 42.4 37.0 - 48.5 %    MCV 93 82 - 98 fL    MCH 30.3 27.0 - 31.0 pg    MCHC 32.5 32.0 - 36.0 g/dL    RDW 12.3 11.5 - 14.5 %    Platelets 301 150 - 350 K/uL    MPV 9.5 9.2 - 12.9 fL    Immature Granulocytes 0.4 0.0 - 0.5 %    Gran # (ANC) 12.3 (H) 1.8 - 7.7 K/uL    Immature Grans (Abs) 0.08 (H) 0.00 - 0.04 K/uL    Lymph # 4.8 1.0 - 4.8 K/uL    Mono # 1.1 (H) 0.3 - 1.0 K/uL    Eos # 0.2 0.0 - 0.5 K/uL    Baso # 0.05 0.00 - 0.20 K/uL    nRBC 0 0 /100 WBC    Gran% 66.5 38.0 - 73.0 %    Lymph% 26.1 18.0 - 48.0 %    Mono% 5.9 4.0 - 15.0 %    Eosinophil% 0.8 0.0 - 8.0 %    Basophil% 0.3 0.0 - 1.9 %    Differential Method Automated    Sedimentation rate    Collection Time: 04/21/19 12:52 AM   Result Value Ref Range    Sed Rate 16 0 - 36 mm/Hr   C-reactive protein    Collection Time: 04/21/19 12:52 AM   Result Value Ref Range    CRP 15.2 (H) 0.0 - 8.2 mg/L   Basic metabolic panel    Collection Time: 04/21/19 12:52 AM   Result Value Ref Range    Sodium 141 136 - 145 mmol/L     Potassium 4.0 3.5 - 5.1 mmol/L    Chloride 106 95 - 110 mmol/L    CO2 22 (L) 23 - 29 mmol/L    Glucose 112 (H) 70 - 110 mg/dL    BUN, Bld 15 6 - 20 mg/dL    Creatinine 0.8 0.5 - 1.4 mg/dL    Calcium 8.9 8.7 - 10.5 mg/dL    Anion Gap 13 8 - 16 mmol/L    eGFR if African American >60.0 >60 mL/min/1.73 m^2    eGFR if non African American >60.0 >60 mL/min/1.73 m^2       Diagnostic Results:  X-Ray: Reviewed    ASSESSMENT/PLAN:     A/P: Geneva Rodriguez is a 53 y.o. 2 days s/p cat bite to left thumb. Patient has pain with ROM and erythema but with minimal swelling and normal resting posture of hand.       Plan:  - Unclear clinical picture at this time, most likely represents cellulitis however cannot r/o deep space infection. Will order MRI hand w/ wo contrast to detemine extent of infection. NPO as precaution. Admitted to medicine. IV unasyn as empiric coverage. Keep elevated at all times.       Juan Rdz M.D. PGY2  Orthopedic Surgery

## 2019-04-21 NOTE — TRANSFER OF CARE
"Anesthesia Transfer of Care Note    Patient: Geneva Rodriguez    Procedure(s) Performed: Procedure(s) (LRB):  Incision and Drainage. Hand. Hand table, hand set. 3L NS. (Left)  RELEASE, CARPAL TUNNEL (Left)    Patient location: PACU    Anesthesia Type: general    Transport from OR: Transported from OR on 6-10 L/min O2 by face mask with adequate spontaneous ventilation    Post pain: adequate analgesia    Post assessment: no apparent anesthetic complications and tolerated procedure well    Post vital signs: stable    Level of consciousness: responds to stimulation and sedated    Nausea/Vomiting: no nausea/vomiting    Complications: none    Transfer of care protocol was followed      Last vitals:   Visit Vitals  /63 (BP Location: Right arm, Patient Position: Lying)   Pulse 102   Temp 36.4 °C (97.5 °F) (Temporal)   Resp 18   Ht 5' 4" (1.626 m)   Wt 102.1 kg (225 lb)   SpO2 96%   Breastfeeding? No   BMI 38.62 kg/m²     "

## 2019-04-21 NOTE — ED NOTES
Pt presents to ED after she was bitten by her pet kitten Friday. Bite is to left thumb. Tooth puncture not visible, but let thumb is noticeably swollen compare to right. Pt states that her entire left hand, including all of her fingers feel swollen and painful. Pt states that since she has been at work tonight pain has began to travel to her wrist and she has decreased ROM to left hand. Pt denies drainage from left thumb.

## 2019-04-22 LAB
ALBUMIN SERPL BCP-MCNC: 3.3 G/DL (ref 3.5–5.2)
ALP SERPL-CCNC: 63 U/L (ref 55–135)
ALT SERPL W/O P-5'-P-CCNC: 20 U/L (ref 10–44)
ANION GAP SERPL CALC-SCNC: 10 MMOL/L (ref 8–16)
AST SERPL-CCNC: 15 U/L (ref 10–40)
BASOPHILS # BLD AUTO: 0.02 K/UL (ref 0–0.2)
BASOPHILS NFR BLD: 0.1 % (ref 0–1.9)
BILIRUB SERPL-MCNC: 0.4 MG/DL (ref 0.1–1)
BUN SERPL-MCNC: 12 MG/DL (ref 6–20)
CALCIUM SERPL-MCNC: 8.9 MG/DL (ref 8.7–10.5)
CHLORIDE SERPL-SCNC: 105 MMOL/L (ref 95–110)
CO2 SERPL-SCNC: 24 MMOL/L (ref 23–29)
CREAT SERPL-MCNC: 0.7 MG/DL (ref 0.5–1.4)
DIFFERENTIAL METHOD: ABNORMAL
EOSINOPHIL # BLD AUTO: 0 K/UL (ref 0–0.5)
EOSINOPHIL NFR BLD: 0 % (ref 0–8)
ERYTHROCYTE [DISTWIDTH] IN BLOOD BY AUTOMATED COUNT: 12.4 % (ref 11.5–14.5)
EST. GFR  (AFRICAN AMERICAN): >60 ML/MIN/1.73 M^2
EST. GFR  (NON AFRICAN AMERICAN): >60 ML/MIN/1.73 M^2
GLUCOSE SERPL-MCNC: 115 MG/DL (ref 70–110)
HCT VFR BLD AUTO: 37.6 % (ref 37–48.5)
HGB BLD-MCNC: 12.3 G/DL (ref 12–16)
IMM GRANULOCYTES # BLD AUTO: 0.08 K/UL (ref 0–0.04)
IMM GRANULOCYTES NFR BLD AUTO: 0.5 % (ref 0–0.5)
LYMPHOCYTES # BLD AUTO: 3.8 K/UL (ref 1–4.8)
LYMPHOCYTES NFR BLD: 21.8 % (ref 18–48)
MAGNESIUM SERPL-MCNC: 1.8 MG/DL (ref 1.6–2.6)
MCH RBC QN AUTO: 30.2 PG (ref 27–31)
MCHC RBC AUTO-ENTMCNC: 32.7 G/DL (ref 32–36)
MCV RBC AUTO: 92 FL (ref 82–98)
MONOCYTES # BLD AUTO: 0.9 K/UL (ref 0.3–1)
MONOCYTES NFR BLD: 5.3 % (ref 4–15)
NEUTROPHILS # BLD AUTO: 12.7 K/UL (ref 1.8–7.7)
NEUTROPHILS NFR BLD: 72.3 % (ref 38–73)
NRBC BLD-RTO: 0 /100 WBC
PHOSPHATE SERPL-MCNC: 3.2 MG/DL (ref 2.7–4.5)
PLATELET # BLD AUTO: 285 K/UL (ref 150–350)
PMV BLD AUTO: 10.1 FL (ref 9.2–12.9)
POTASSIUM SERPL-SCNC: 3.8 MMOL/L (ref 3.5–5.1)
PROT SERPL-MCNC: 6.6 G/DL (ref 6–8.4)
RBC # BLD AUTO: 4.07 M/UL (ref 4–5.4)
SODIUM SERPL-SCNC: 139 MMOL/L (ref 136–145)
WBC # BLD AUTO: 17.51 K/UL (ref 3.9–12.7)

## 2019-04-22 PROCEDURE — 99231 PR SUBSEQUENT HOSPITAL CARE,LEVL I: ICD-10-PCS | Mod: ,,, | Performed by: INTERNAL MEDICINE

## 2019-04-22 PROCEDURE — 25000242 PHARM REV CODE 250 ALT 637 W/ HCPCS: Performed by: PHYSICIAN ASSISTANT

## 2019-04-22 PROCEDURE — 99231 SBSQ HOSP IP/OBS SF/LOW 25: CPT | Mod: ,,, | Performed by: INTERNAL MEDICINE

## 2019-04-22 PROCEDURE — 99254 IP/OBS CNSLTJ NEW/EST MOD 60: CPT | Mod: ,,, | Performed by: INTERNAL MEDICINE

## 2019-04-22 PROCEDURE — 25000003 PHARM REV CODE 250: Performed by: STUDENT IN AN ORGANIZED HEALTH CARE EDUCATION/TRAINING PROGRAM

## 2019-04-22 PROCEDURE — 99900038 HC OT GENERIC THERAPY SCREENING (STAT)

## 2019-04-22 PROCEDURE — 99900035 HC TECH TIME PER 15 MIN (STAT)

## 2019-04-22 PROCEDURE — 99254 PR INITIAL INPATIENT CONSULT,LEVL IV: ICD-10-PCS | Mod: ,,, | Performed by: INTERNAL MEDICINE

## 2019-04-22 PROCEDURE — 25000003 PHARM REV CODE 250: Performed by: INTERNAL MEDICINE

## 2019-04-22 PROCEDURE — 36415 COLL VENOUS BLD VENIPUNCTURE: CPT

## 2019-04-22 PROCEDURE — 25000003 PHARM REV CODE 250: Performed by: PHYSICIAN ASSISTANT

## 2019-04-22 PROCEDURE — 84100 ASSAY OF PHOSPHORUS: CPT

## 2019-04-22 PROCEDURE — 94799 UNLISTED PULMONARY SVC/PX: CPT

## 2019-04-22 PROCEDURE — 85025 COMPLETE CBC W/AUTO DIFF WBC: CPT

## 2019-04-22 PROCEDURE — 20600001 HC STEP DOWN PRIVATE ROOM

## 2019-04-22 PROCEDURE — 63600175 PHARM REV CODE 636 W HCPCS: Performed by: PHYSICIAN ASSISTANT

## 2019-04-22 PROCEDURE — 83735 ASSAY OF MAGNESIUM: CPT

## 2019-04-22 PROCEDURE — 63600175 PHARM REV CODE 636 W HCPCS: Performed by: STUDENT IN AN ORGANIZED HEALTH CARE EDUCATION/TRAINING PROGRAM

## 2019-04-22 PROCEDURE — 80053 COMPREHEN METABOLIC PANEL: CPT

## 2019-04-22 RX ORDER — MORPHINE SULFATE 2 MG/ML
2 INJECTION, SOLUTION INTRAMUSCULAR; INTRAVENOUS
Status: DISCONTINUED | OUTPATIENT
Start: 2019-04-22 | End: 2019-04-25 | Stop reason: HOSPADM

## 2019-04-22 RX ORDER — HYDROMORPHONE HYDROCHLORIDE 1 MG/ML
0.5 INJECTION, SOLUTION INTRAMUSCULAR; INTRAVENOUS; SUBCUTANEOUS ONCE
Status: COMPLETED | OUTPATIENT
Start: 2019-04-22 | End: 2019-04-22

## 2019-04-22 RX ORDER — PREGABALIN 150 MG/1
150 CAPSULE ORAL NIGHTLY
Status: DISCONTINUED | OUTPATIENT
Start: 2019-04-22 | End: 2019-04-25 | Stop reason: HOSPADM

## 2019-04-22 RX ORDER — ONDANSETRON 2 MG/ML
4 INJECTION INTRAMUSCULAR; INTRAVENOUS EVERY 8 HOURS PRN
Status: DISCONTINUED | OUTPATIENT
Start: 2019-04-22 | End: 2019-04-25 | Stop reason: HOSPADM

## 2019-04-22 RX ORDER — CELECOXIB 200 MG/1
200 CAPSULE ORAL DAILY
Status: DISCONTINUED | OUTPATIENT
Start: 2019-04-23 | End: 2019-04-25 | Stop reason: HOSPADM

## 2019-04-22 RX ORDER — MORPHINE SULFATE 2 MG/ML
2 INJECTION, SOLUTION INTRAMUSCULAR; INTRAVENOUS ONCE
Status: DISCONTINUED | OUTPATIENT
Start: 2019-04-22 | End: 2019-04-22

## 2019-04-22 RX ORDER — MORPHINE SULFATE ORAL SOLUTION 10 MG/5ML
5 SOLUTION ORAL ONCE AS NEEDED
Status: COMPLETED | OUTPATIENT
Start: 2019-04-22 | End: 2019-04-22

## 2019-04-22 RX ORDER — FLUTICASONE PROPIONATE 50 MCG
2 SPRAY, SUSPENSION (ML) NASAL DAILY
Status: DISCONTINUED | OUTPATIENT
Start: 2019-04-22 | End: 2019-04-25 | Stop reason: HOSPADM

## 2019-04-22 RX ORDER — CELECOXIB 200 MG/1
400 CAPSULE ORAL ONCE
Status: COMPLETED | OUTPATIENT
Start: 2019-04-22 | End: 2019-04-22

## 2019-04-22 RX ORDER — OXYCODONE HYDROCHLORIDE 5 MG/1
5 TABLET ORAL
Status: DISCONTINUED | OUTPATIENT
Start: 2019-04-22 | End: 2019-04-25 | Stop reason: HOSPADM

## 2019-04-22 RX ORDER — ACETAMINOPHEN 500 MG
1000 TABLET ORAL EVERY 6 HOURS
Status: DISPENSED | OUTPATIENT
Start: 2019-04-22 | End: 2019-04-24

## 2019-04-22 RX ORDER — ACETAMINOPHEN 10 MG/ML
1000 INJECTION, SOLUTION INTRAVENOUS ONCE
Status: COMPLETED | OUTPATIENT
Start: 2019-04-22 | End: 2019-04-22

## 2019-04-22 RX ORDER — OXYCODONE HYDROCHLORIDE 10 MG/1
10 TABLET ORAL
Status: DISCONTINUED | OUTPATIENT
Start: 2019-04-22 | End: 2019-04-25 | Stop reason: HOSPADM

## 2019-04-22 RX ORDER — ONDANSETRON 2 MG/ML
4 INJECTION INTRAMUSCULAR; INTRAVENOUS EVERY 12 HOURS PRN
Status: DISCONTINUED | OUTPATIENT
Start: 2019-04-22 | End: 2019-04-22

## 2019-04-22 RX ADMIN — AMPICILLIN SODIUM AND SULBACTAM SODIUM 3 G: 2; 1 INJECTION, POWDER, FOR SOLUTION INTRAMUSCULAR; INTRAVENOUS at 01:04

## 2019-04-22 RX ADMIN — ONDANSETRON 8 MG: 8 TABLET, ORALLY DISINTEGRATING ORAL at 03:04

## 2019-04-22 RX ADMIN — HYDROMORPHONE HYDROCHLORIDE 0.5 MG: 1 INJECTION, SOLUTION INTRAMUSCULAR; INTRAVENOUS; SUBCUTANEOUS at 04:04

## 2019-04-22 RX ADMIN — ACETAMINOPHEN 1000 MG: 325 TABLET ORAL at 05:04

## 2019-04-22 RX ADMIN — SENNOSIDES AND DOCUSATE SODIUM 1 TABLET: 8.6; 5 TABLET ORAL at 08:04

## 2019-04-22 RX ADMIN — OXYCODONE HYDROCHLORIDE 5 MG: 5 TABLET ORAL at 09:04

## 2019-04-22 RX ADMIN — MORPHINE SULFATE 5 MG: 10 SOLUTION ORAL at 01:04

## 2019-04-22 RX ADMIN — ACETAMINOPHEN 650 MG: 325 TABLET ORAL at 09:04

## 2019-04-22 RX ADMIN — ACETAMINOPHEN 1000 MG: 10 INJECTION, SOLUTION INTRAVENOUS at 01:04

## 2019-04-22 RX ADMIN — MORPHINE SULFATE 2 MG: 2 INJECTION, SOLUTION INTRAMUSCULAR; INTRAVENOUS at 06:04

## 2019-04-22 RX ADMIN — FLUTICASONE PROPIONATE 100 MCG: 50 SPRAY, METERED NASAL at 11:04

## 2019-04-22 RX ADMIN — PREGABALIN 150 MG: 150 CAPSULE ORAL at 08:04

## 2019-04-22 RX ADMIN — AMPICILLIN SODIUM AND SULBACTAM SODIUM 3 G: 2; 1 INJECTION, POWDER, FOR SOLUTION INTRAMUSCULAR; INTRAVENOUS at 07:04

## 2019-04-22 RX ADMIN — MORPHINE SULFATE 2 MG: 2 INJECTION, SOLUTION INTRAMUSCULAR; INTRAVENOUS at 01:04

## 2019-04-22 RX ADMIN — AMPICILLIN SODIUM AND SULBACTAM SODIUM 3 G: 2; 1 INJECTION, POWDER, FOR SOLUTION INTRAMUSCULAR; INTRAVENOUS at 02:04

## 2019-04-22 RX ADMIN — OXYCODONE HYDROCHLORIDE 10 MG: 10 TABLET ORAL at 05:04

## 2019-04-22 RX ADMIN — OXYCODONE HYDROCHLORIDE 5 MG: 5 TABLET ORAL at 03:04

## 2019-04-22 RX ADMIN — AMPICILLIN SODIUM AND SULBACTAM SODIUM 3 G: 2; 1 INJECTION, POWDER, FOR SOLUTION INTRAMUSCULAR; INTRAVENOUS at 08:04

## 2019-04-22 RX ADMIN — CELECOXIB 400 MG: 200 CAPSULE ORAL at 02:04

## 2019-04-22 NOTE — CONSULTS
[facility]  Infectious Disease  Consult Note    Patient Name: Geneva Rodriguez  MRN: [id number]  Admission Date: 4/21/2019  Hospital Length of Stay: 1 days  Attending Physician: Ori Menezes MD  Primary Care Provider: Primary Doctor No     Isolation Status: No active isolations      Inpatient consult to Infectious Diseases  Consult performed by: Harvinder Son PA-C  Consult ordered by: Natalie Du MD      ID consult received. Chart being reviewed. Full consult note with recommendations to follow.      Thank you,  Harvinder Son PA-C  [id number]

## 2019-04-22 NOTE — PLAN OF CARE
04/22/19 1034   Discharge Assessment   Assessment Type Discharge Planning Assessment   Confirmed/corrected address and phone number on facesheet? Yes   Assessment information obtained from? Patient;Medical Record   Expected Length of Stay (days) 3   Communicated expected length of stay with patient/caregiver yes  (Per MD)   Prior to hospitilization cognitive status: Alert/Oriented;No Deficits   Prior to hospitalization functional status: Independent   Current cognitive status: Alert/Oriented;No Deficits   Current Functional Status: Independent   Facility Arrived From: Home   Lives With child(myah), adult  (Daughter)   Able to Return to Prior Arrangements yes   Is patient able to care for self after discharge? Yes   Who are your caregiver(s) and their phone number(s)? Self Care   Patient's perception of discharge disposition home or selfcare   Readmission Within the Last 30 Days no previous admission in last 30 days   Patient currently being followed by outpatient case management? No   Patient currently receives any other outside agency services? No   Equipment Currently Used at Home nebulizer   Do you have any problems affording any of your prescribed medications? No   Is the patient taking medications as prescribed? yes   Does the patient have transportation home? Yes   Transportation Anticipated family or friend will provide   Dialysis Name and Scheduled days N/A   Does the patient receive services at the Coumadin Clinic? No   Discharge Plan A Home   Discharge Plan B Home;Home Health   DME Needed Upon Discharge  none   Patient/Family in Agreement with Plan yes     CM to the Bedside to see the patient. The patient states she resides with her Daughter in a Single Story Home that has 6 RA with a railing. The patient denies the use of DME to assist with ADL's but does state she has a Nebulizer at Home that she uses when needed. The patient is currently employed and she is able to drive herself around as needed. She  was notified that the CM team would be following throughout this hospital admission for D/C needs and/or recommendations. Current D/C plan is Home with No Needs. Patient is aware and agrees. CM placed name and number on the board at the Bedside for the patient to call with any D/C needs or questions. Understanding verbalized.     My Health Packet reviewed with the patient and left at the Bedside during this CM visit.

## 2019-04-22 NOTE — ASSESSMENT & PLAN NOTE
Patient is a 53 year old lady with no significant medical history.  She presents with left thumb pain following a cat bite on Friday.  MRI concerning for tenosynovitis of flexor tendons.  S/P I and D 4/21- Ortho op note-a large pocket of purulent fluid within the flexor tendon sheath in the deep carpal tunnel compartment.  Cxs taken.    Pt reports not being up to date on her tetanus    Plan  - Continue pt on Unasyn  - Tdap ordered  - Will follow cxs  - Anticipate pt will need 2 weeks IV therapy   - ID will continue to follow

## 2019-04-22 NOTE — PROGRESS NOTES
[facility] Hospital Medicine                                                                     Progress Note     Team: [facility] HOSP MED A Ori Menezes MD   Admit Date: 4/21/2019   Hospital Day: 1  YAKELIN: 4/23/2019   Code status: Full Code   Principal Problem: Abscess of left hand     SUMMARY:     Geneva Rodriguez is a 53 yom with no significant pmhx presenting post cat bite and cellulitis found to have left hand abscess sp I/D 4/21/19.       SUBJECTIVE:     Pt was seen and examined at bedside. Pt had no acute events overnight, and no new complaints this morning. Pt remained hemodynamically stable and afebrile. Swelling noted on thumb. Cultures NGTD prelim, final pending.       ROS (Positive in Bold, otherwise negative)  Pain Scale: 2 /10   Constitutional: fever, chills, night sweats  CV: chest pain, edema, palpitations  Resp: SOB, cough, sputum production  GI: changes in appetite, NVDC, pain, melena, hematochezia, GERD, hematemesis  : Dysuria, hematuria, urinary urgency, frequency  MSK: arthralgia/myalgia, joint swelling  SKIN: rashes, pruritis, petechiae   Neuro/Psych: FND, anxiety, depression      OBJECTIVE:     Vitals:  Temp:  [96.3 °F (35.7 °C)-98.1 °F (36.7 °C)]   Pulse:  [70-86]   Resp:  [16-19]   BP: ()/(47-71)   SpO2:  [92 %-97 %]      I & O (Last 24H):   No intake or output data in the 24 hours ending 04/22/19 1307      GEN:  female  in no acute distress. Nontoxic. Resting in bed. Cooperative.  HEENT: NCAT. PERRL. EOMI. Conjunctivae/corneas clear, sclera Anicteric.  CVS: RRR. Normal s1 s2 no murmur, click, rub or gallop  LUNG: CTAB. Normal respiratory effort. No wheezes, rhonchi, or crackles.  ABD: Normoactive BS, soft, NT, ND, no masses or organomegaly.  EXT: Left arm splint in place  SKIN: color, texture, turgor normal. No rashes or  lesions  NEURO: Alert, oriented x 4, Spont mvt of all extremities. Decreased sensation of distal left thumb noted.      All recent labs and imaging has been reviewed.     Recent Results (from the past 24 hour(s))   Comprehensive Metabolic Panel (CMP)    Collection Time: 04/22/19  5:02 AM   Result Value Ref Range    Sodium 139 136 - 145 mmol/L    Potassium 3.8 3.5 - 5.1 mmol/L    Chloride 105 95 - 110 mmol/L    CO2 24 23 - 29 mmol/L    Glucose 115 (H) 70 - 110 mg/dL    BUN, Bld 12 6 - 20 mg/dL    Creatinine 0.7 0.5 - 1.4 mg/dL    Calcium 8.9 8.7 - 10.5 mg/dL    Total Protein 6.6 6.0 - 8.4 g/dL    Albumin 3.3 (L) 3.5 - 5.2 g/dL    Total Bilirubin 0.4 0.1 - 1.0 mg/dL    Alkaline Phosphatase 63 55 - 135 U/L    AST 15 10 - 40 U/L    ALT 20 10 - 44 U/L    Anion Gap 10 8 - 16 mmol/L    eGFR if African American >60.0 >60 mL/min/1.73 m^2    eGFR if non African American >60.0 >60 mL/min/1.73 m^2   Magnesium    Collection Time: 04/22/19  5:02 AM   Result Value Ref Range    Magnesium 1.8 1.6 - 2.6 mg/dL   Phosphorus    Collection Time: 04/22/19  5:02 AM   Result Value Ref Range    Phosphorus 3.2 2.7 - 4.5 mg/dL   CBC with Automated Differential    Collection Time: 04/22/19  5:02 AM   Result Value Ref Range    WBC 17.51 (H) 3.90 - 12.70 K/uL    RBC 4.07 4.00 - 5.40 M/uL    Hemoglobin 12.3 12.0 - 16.0 g/dL    Hematocrit 37.6 37.0 - 48.5 %    MCV 92 82 - 98 fL    MCH 30.2 27.0 - 31.0 pg    MCHC 32.7 32.0 - 36.0 g/dL    RDW 12.4 11.5 - 14.5 %    Platelets 285 150 - 350 K/uL    MPV 10.1 9.2 - 12.9 fL    Immature Granulocytes 0.5 0.0 - 0.5 %    Gran # (ANC) 12.7 (H) 1.8 - 7.7 K/uL    Immature Grans (Abs) 0.08 (H) 0.00 - 0.04 K/uL    Lymph # 3.8 1.0 - 4.8 K/uL    Mono # 0.9 0.3 - 1.0 K/uL    Eos # 0.0 0.0 - 0.5 K/uL    Baso # 0.02 0.00 - 0.20 K/uL    nRBC 0 0 /100 WBC    Gran% 72.3 38.0 - 73.0 %    Lymph% 21.8 18.0 - 48.0 %    Mono% 5.3 4.0 - 15.0 %    Eosinophil% 0.0 0.0 - 8.0 %    Basophil% 0.1 0.0 - 1.9 %    Differential Method  Automated        No results for input(s): POCTGLUCOSE in the last 168 hours.    No results found for: HGBA1C     Active Hospital Problems    Diagnosis  POA    *Abscess of left hand [L02.512]  Yes    Cellulitis [L03.90]  Yes    Cat bite of left thumb [S61.052A, W55.01XA]  Yes      Resolved Hospital Problems   No resolved problems to display.          ASSESSMENT AND PLAN:       Abscess of the left hand with cellulitis secondary to cat bite  - As per MRI  - Started on Unasyn, continue, likely need 2 weeks of abx with repeat +/- imaging  - S/p ID now with splint 4/21/19  - Wound cultures NGTD  - Leukocytosis mildly improved   - NWB LUE and keep elevated at all times to minimize swelling  - Analgesics PRN ordered  - ID consulted   - Ortho following  - Remains HDS and afebrile       Prophylaxis- SCDs    Code Status- Full Code     Discharge plan and follow up - d/c home once medically stable    Ori Menezes MD  Hospital Medicine Staff  Pager [phone]

## 2019-04-22 NOTE — PROGRESS NOTES
[facility]  Orthopedics  Progress Note    Patient Name: Geneva Rodriguez  MRN: [id number]  Admission Date: 4/21/2019  Hospital Length of Stay: 1 days  Attending Provider: Ori Menezes MD  Primary Care Provider: Primary Doctor No  Follow-up For: Procedure(s) (LRB):  Incision and Drainage Hand (Left)  RELEASE, CARPAL TUNNEL (Left)  RELEASE, TRIGGER FINGER, THUMB    Post-Operative Day: 1 Day Post-Op  Subjective:     Principal Problem:Cellulitis    Principal Orthopedic Problem: same    Interval History: Patient seen and examined at bedside.   No acute events overnight.  Afebrile. Persistent pain left hand, decreased sensation in left thumb unchanged.     Review of patient's allergies indicates:  No Known Allergies    Current Facility-Administered Medications   Medication    acetaminophen tablet 650 mg    albuterol-ipratropium 2.5 mg-0.5 mg/3 mL nebulizer solution 3 mL    ampicillin-sulbactam 3 g in sodium chloride 0.9 % 100 mL IVPB (ready to mix system)    dextrose 50% injection 12.5 g    dextrose 50% injection 25 g    glucagon (human recombinant) injection 1 mg    glucose chewable tablet 16 g    glucose chewable tablet 24 g    HYDROmorphone injection 0.2 mg    metoclopramide HCl injection 10 mg    ondansetron disintegrating tablet 8 mg    oxyCODONE immediate release tablet 5 mg    promethazine (PHENERGAN) 6.25 mg in dextrose 5 % 50 mL IVPB    promethazine tablet 25 mg    ramelteon tablet 8 mg    senna-docusate 8.6-50 mg per tablet 1 tablet    sodium chloride 0.9% flush 10 mL    sodium chloride 0.9% flush 5 mL     Objective:     Vital Signs (Most Recent):  Temp: 96.7 °F (35.9 °C) (04/22/19 0745)  Pulse: 75 (04/22/19 0745)  Resp: 19 (04/22/19 0745)  BP: 127/64 (04/22/19 0745)  SpO2: 97 % (04/22/19 0745) Vital Signs (24h Range):  Temp:  [96.7 °F (35.9 °C)-98.1 °F (36.7 °C)] 96.7 °F (35.9 °C)  Pulse:  [] 75  Resp:  [11-22] 19  SpO2:  [92 %-100 %] 97 %  BP: ()/(47-71) 127/64  "    Weight: 102.1 kg (225 lb)  Height: 5' 4" (162.6 cm)  Body mass index is 38.62 kg/m².      Intake/Output Summary (Last 24 hours) at 4/22/2019 0859  Last data filed at 4/21/2019 0913  Gross per 24 hour   Intake 600 ml   Output --   Net 600 ml       Ortho/SPM Exam    Physical Exam:  NAD, A/O x 3.  Splint in place c/d/i  Mild decrease in sensation distal tip of left thumb  BCR all digits      Significant Labs: All pertinent labs within the past 24 hours have been reviewed.    Significant Imaging: I have reviewed and interpreted all pertinent imaging results/findings.    Assessment/Plan:     * Abscess of left hand  Geneva Rodriguez is a 53 y.o. female with left hand abscess from cat bite s/p I&D 4/21/19  - IV unasyn- gram stain negative, cx pending  - pain control per primary  - NWB LUE, keep LUE elevated at all times to minimize swelling  - keep dressing c/d/i  - WBC 17.5 this am  - will continue to monitor for improvement    Cellulitis                  Natalie Du MD  Orthopedics  [facility]  "

## 2019-04-22 NOTE — ASSESSMENT & PLAN NOTE
Geneva Rodriguez is a 53 y.o. female with left hand abscess from cat bite s/p I&D 4/21/19  - IV unasyn- gram stain negative, cx pending  - pain control per primary  - NWB LUE, keep LUE elevated at all times to minimize swelling  - keep dressing c/d/i  - WBC 17.5 this am  - will continue to monitor for improvement

## 2019-04-22 NOTE — SUBJECTIVE & OBJECTIVE
Past Medical History:   Diagnosis Date    Ruptured spleen        Past Surgical History:   Procedure Laterality Date    Incision and Drainage Hand Left 4/21/2019    Performed by Emiliano Mart[facility]nez MD at [facility] OR 2ND FLR    RELEASE, CARPAL TUNNEL Left 4/21/2019    Performed by Emiliano Martínez MD at [facility] OR 2ND [facility]LR    RELEASE, TRIGGER FINGER, THUMB  4/21/2019    Performed by Emiliano Martínez MD at [facility] OR 2ND FLR       Review of patient's allergies indicates:  No Known Allergies    Medications:  No medications prior to admission.     Antibiotics (From admission, onward)    Start     Stop Route Frequency Ordered    04/21/19 0800  ampicillin-sulbactam 3 g in sodium chloride 0.9 % 100 mL IVPB (ready to mix system)      -- IV Every 6 hours (non-standard times) 04/21/19 0448        Antifungals (From admission, onward)    None        Antivirals (From admission, onward)    None           There is no immunization history for the selected administration types on file for this patient.    Family History     None        Social History     Socioeconomic History    Marital status: Single     Spouse name: Not on file    Number of children: Not on file    Years of education: Not on file    Highest education level: Not on file   Occupational History    Not on file   Social Needs    Financial resource strain: Not on file    Food insecurity:     Worry: Not on file     Inability: Not on file    Transportation needs:     Medical: Not on file     Non-medical: Not on file   Tobacco Use    Smoking status: Never Smoker   Substance and Sexual Activity    Alcohol use: Not on file    Drug use: Not on file    Sexual activity: Not on file   Lifestyle    Physical activity:     Days per week: Not on file     Minutes per session: Not on file    Stress: Not on file   Relationships    Social connections:     Talks on phone: Not on file     Gets together: Not on file     Attends Yazidism service: Not on file     Active member  of club or organization: Not on file     Attends meetings of clubs or organizations: Not on file     Relationship status: Not on file   Other Topics Concern    Not on file   Social History Narrative    Not on file     Review of Systems   Constitutional: Negative for activity change, appetite change, chills, fatigue, fever and unexpected weight change.   HENT: Negative for congestion, rhinorrhea, sore throat, trouble swallowing and voice change.    Eyes: Negative for visual disturbance.   Respiratory: Negative for cough, choking, chest tightness, shortness of breath and wheezing.    Cardiovascular: Negative for chest pain, palpitations and leg swelling.   Gastrointestinal: Negative for abdominal distention, abdominal pain, anal bleeding, blood in stool, constipation, diarrhea, nausea and vomiting.   Endocrine: Negative for cold intolerance, heat intolerance, polydipsia and polyuria.   Genitourinary: Negative for dysuria, flank pain, frequency, hematuria and urgency.   Musculoskeletal: Negative for arthralgias, back pain, joint swelling and myalgias.        Pain and swelling to L thumb   Skin: Negative for color change and rash.   Neurological: Negative for dizziness, seizures, syncope, facial asymmetry, speech difficulty, weakness, light-headedness, numbness and headaches.   Hematological: Negative for adenopathy. Does not bruise/bleed easily.   Psychiatric/Behavioral: Negative for agitation, confusion, hallucinations and suicidal ideas.     Objective:     Vital Signs (Most Recent):  Temp: 97.2 °F (36.2 °C) (04/22/19 1603)  Pulse: 74 (04/22/19 1603)  Resp: 17 (04/22/19 1603)  BP: 130/62 (04/22/19 1603)  SpO2: (!) 93 % (04/22/19 1603) Vital Signs (24h Range):  Temp:  [96.3 °F (35.7 °C)-98 °F (36.7 °C)] 97.2 °F (36.2 °C)  Pulse:  [70-86] 74  Resp:  [16-19] 17  SpO2:  [92 %-97 %] 93 %  BP: ()/(47-71) 130/62     Weight: 102.1 kg (225 lb)  Body mass index is 38.62 kg/m².    Estimated Creatinine Clearance: 105.7  mL/min (based on SCr of 0.7 mg/dL).    Physical Exam   Constitutional: She is oriented to person, place, and time. She appears well-developed and well-nourished. No distress.   HENT:   Head: Normocephalic and atraumatic.   Eyes: Pupils are equal, round, and reactive to light.   Neck: Neck supple. Carotid bruit is not present. No thyromegaly present.   Cardiovascular: Normal rate and regular rhythm. Exam reveals no gallop.   No murmur heard.  Pulmonary/Chest: Effort normal and breath sounds normal. No respiratory distress. She has no wheezes.   Abdominal: Soft. Bowel sounds are normal. She exhibits no distension and no mass. There is no splenomegaly or hepatomegaly. There is no tenderness.   Musculoskeletal: Normal range of motion. She exhibits no edema or deformity.   Bandage to L hand C/D/I   Neurological: She is alert and oriented to person, place, and time. No cranial nerve deficit or sensory deficit.   Skin: Skin is warm and dry. No rash noted.   Psychiatric: She has a normal mood and affect.       Significant Labs:   Blood Culture: No results for input(s): LABBLOO in the last 4320 hours.  BMP:   Recent Labs   Lab 04/22/19  0502   *      K 3.8      CO2 24   BUN 12   CREATININE 0.7   CALCIUM 8.9   MG 1.8     CBC:   Recent Labs   Lab 04/21/19  0052 04/21/19  0600 04/22/19  0502   WBC 18.54* 18.08* 17.51*   HGB 13.8 13.2 12.3   HCT 42.4 40.1 37.6    278 285     CMP:   Recent Labs   Lab 04/21/19  0052 04/21/19  0600 04/22/19  0502    141 139   K 4.0 3.7 3.8    106 105   CO2 22* 24 24   * 103 115*   BUN 15 16 12   CREATININE 0.8 0.8 0.7   CALCIUM 8.9 9.5 8.9   PROT  --  7.3 6.6   ALBUMIN  --  3.9 3.3*   BILITOT  --  0.7 0.4   ALKPHOS  --  68 63   AST  --  32 15   ALT  --  28 20   ANIONGAP 13 11 10   EGFRNONAA >60.0 >60.0 >60.0     Wound Culture:   Recent Labs   Lab 04/21/19  0903   LABAERO No growth     All pertinent labs within the past 24 hours have been  reviewed.    Significant Imaging: None

## 2019-04-22 NOTE — PT/OT/SLP PROGRESS
Occupational Therapy  Screen/Discharge    Patient Name:  Geneva Rodriguez   MRN:  [id number]    OT orders acknowledged and discontinued 4/22/19. Pt s/p I&D L hand; current orders for NWB LUE and keep elevated at all times to minimize swelling. Pt completing mobility and ADLs and does not require acute OT. Recommend outpatient therapy as needed for L UE once dressing/splint removed and pt cleared to mobilize.      ELHAM Miles  4/22/2019

## 2019-04-22 NOTE — SUBJECTIVE & OBJECTIVE
"Principal Problem:Cellulitis    Principal Orthopedic Problem: same    Interval History: Patient seen and examined at bedside.   No acute events overnight.  Afebrile. Persistent pain left hand, decreased sensation in left thumb unchanged.     Review of patient's allergies indicates:  No Known Allergies    Current Facility-Administered Medications   Medication    acetaminophen tablet 650 mg    albuterol-ipratropium 2.5 mg-0.5 mg/3 mL nebulizer solution 3 mL    ampicillin-sulbactam 3 g in sodium chloride 0.9 % 100 mL IVPB (ready to mix system)    dextrose 50% injection 12.5 g    dextrose 50% injection 25 g    glucagon (human recombinant) injection 1 mg    glucose chewable tablet 16 g    glucose chewable tablet 24 g    HYDROmorphone injection 0.2 mg    metoclopramide HCl injection 10 mg    ondansetron disintegrating tablet 8 mg    oxyCODONE immediate release tablet 5 mg    promethazine (PHENERGAN) 6.25 mg in dextrose 5 % 50 mL IVPB    promethazine tablet 25 mg    ramelteon tablet 8 mg    senna-docusate 8.6-50 mg per tablet 1 tablet    sodium chloride 0.9% flush 10 mL    sodium chloride 0.9% flush 5 mL     Objective:     Vital Signs (Most Recent):  Temp: 96.7 °F (35.9 °C) (04/22/19 0745)  Pulse: 75 (04/22/19 0745)  Resp: 19 (04/22/19 0745)  BP: 127/64 (04/22/19 0745)  SpO2: 97 % (04/22/19 0745) Vital Signs (24h Range):  Temp:  [96.7 °F (35.9 °C)-98.1 °F (36.7 °C)] 96.7 °F (35.9 °C)  Pulse:  [] 75  Resp:  [11-22] 19  SpO2:  [92 %-100 %] 97 %  BP: ()/(47-71) 127/64     Weight: 102.1 kg (225 lb)  Height: 5' 4" (162.6 cm)  Body mass index is 38.62 kg/m².      Intake/Output Summary (Last 24 hours) at 4/22/2019 0859  Last data filed at 4/21/2019 0913  Gross per 24 hour   Intake 600 ml   Output --   Net 600 ml       Ortho/SPM Exam    Physical Exam:  NAD, A/O x 3.  Splint in place c/d/i  Mild decrease in sensation distal tip of left thumb  BCR all digits      Significant Labs: All pertinent labs " within the past 24 hours have been reviewed.    Significant Imaging: I have reviewed and interpreted all pertinent imaging results/findings.

## 2019-04-22 NOTE — CONSULTS
[facility]  Infectious Disease  Consult Note    Patient Name: Geneva Rodriguez  MRN: [id number]  Admission Date: 2019  Hospital Length of Stay: 1 days  Attending Physician: Ori Menezes MD  Primary Care Provider: Talia Chambers MD     Isolation Status: No active isolations    Patient information was obtained from patient, past medical records and ER records.      Consults  Assessment/Plan:     * Abscess of left hand  Patient is a 53 year old lady with no significant medical history.  She presents with left thumb pain following a cat bite on Friday.  MRI concerning for tenosynovitis of flexor tendons.  S/P I and D - Ortho op note-a large pocket of purulent fluid within the flexor tendon sheath in the deep carpal tunnel compartment.  Cxs taken.    Pt reports not being up to date on her tetanus    Plan  - Continue pt on Unasyn  - Tdap ordered  - Will follow cxs  - Anticipate pt will need 2 weeks IV therapy   - ID will continue to follow        Thank you for your consult. I will follow-up with patient. Please contact us if you have any additional questions.    Harvinder Son PA-C  Infectious Disease  [facility]    Subjective:     Principal Problem: Abscess of left hand    HPI: Patient is a 53 year old lady with no significant medical history.  She presents with left thumb pain following a cat bite on Friday.  Patient states that she was attempting to give medication to a  kitten when it bit her left thumb.  Since that time, she has noted increased pain and swelling to left thumb and fingers.  Pain radiates up her forearm.  It is worse with movement.  She took Aleve, which provided mild improvement.  She states she is otherwise in her usual state of health and denies chest pain, SOB, dizziness, palpitations, fever/chills, N/V/D, numbness, tingling.  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  She does not smoke.  MRI concerning for  tenosynovitis of flexor tendons.  S/P I and D 4/21- Ortho op note-a large pocket of purulent fluid within the flexor tendon sheath in the deep carpal tunnel compartment.  Cxs taken.    Pt reports not being up to date on her tetanus      Past Medical History:   Diagnosis Date    Ruptured spleen        Past Surgical History:   Procedure Laterality Date    Incision and Drainage Hand Left 4/21/2019    Performed by Emiliano [facility] MD at [facility] OR 2ND FLR    RELEASE, CARPAL TUNNEL Left 4/21/2019    Performed by Emiliano Martínez MD at [facility] OR 2ND FLR    RELEASE, TRIGGER FINGER, THUMB  4/21/2019    Performed by Emiliano Martínez MD at [facility] OR [facility]       Review of patient's allergies indicates:  No Known Allergies    Medications:  No medications prior to admission.     Antibiotics (From admission, onward)    Start     Stop Route Frequency Ordered    04/21/19 0800  ampicillin-sulbactam 3 g in sodium chloride 0.9 % 100 mL IVPB (ready to mix system)      -- IV Every 6 hours (non-standard times) 04/21/19 0448        Antifungals (From admission, onward)    None        Antivirals (From admission, onward)    None           There is no immunization history for the selected administration types on file for this patient.    Family History     None        Social History     Socioeconomic History    Marital status: Single     Spouse name: Not on file    Number of children: Not on file    Years of education: Not on file    Highest education level: Not on file   Occupational History    Not on file   Social Needs    Financial resource strain: Not on file    Food insecurity:     Worry: Not on file     Inability: Not on file    Transportation needs:     Medical: Not on file     Non-medical: Not on file   Tobacco Use    Smoking status: Never Smoker   Substance and Sexual Activity    Alcohol use: Not on file    Drug use: Not on file    Sexual activity: Not on file   Lifestyle    Physical activity:     Days per week:  Not on file     Minutes per session: Not on file    Stress: Not on file   Relationships    Social connections:     Talks on phone: Not on file     Gets together: Not on file     Attends Protestant service: Not on file     Active member of club or organization: Not on file     Attends meetings of clubs or organizations: Not on file     Relationship status: Not on file   Other Topics Concern    Not on file   Social History Narrative    Not on file     Review of Systems   Constitutional: Negative for activity change, appetite change, chills, fatigue, fever and unexpected weight change.   HENT: Negative for congestion, rhinorrhea, sore throat, trouble swallowing and voice change.    Eyes: Negative for visual disturbance.   Respiratory: Negative for cough, choking, chest tightness, shortness of breath and wheezing.    Cardiovascular: Negative for chest pain, palpitations and leg swelling.   Gastrointestinal: Negative for abdominal distention, abdominal pain, anal bleeding, blood in stool, constipation, diarrhea, nausea and vomiting.   Endocrine: Negative for cold intolerance, heat intolerance, polydipsia and polyuria.   Genitourinary: Negative for dysuria, flank pain, frequency, hematuria and urgency.   Musculoskeletal: Negative for arthralgias, back pain, joint swelling and myalgias.        Pain and swelling to L thumb   Skin: Negative for color change and rash.   Neurological: Negative for dizziness, seizures, syncope, facial asymmetry, speech difficulty, weakness, light-headedness, numbness and headaches.   Hematological: Negative for adenopathy. Does not bruise/bleed easily.   Psychiatric/Behavioral: Negative for agitation, confusion, hallucinations and suicidal ideas.     Objective:     Vital Signs (Most Recent):  Temp: 97.2 °F (36.2 °C) (04/22/19 1603)  Pulse: 74 (04/22/19 1603)  Resp: 17 (04/22/19 1603)  BP: 130/62 (04/22/19 1603)  SpO2: (!) 93 % (04/22/19 1603) Vital Signs (24h Range):  Temp:  [96.3 °F (35.7  °C)-98 °F (36.7 °C)] 97.2 °F (36.2 °C)  Pulse:  [70-86] 74  Resp:  [16-19] 17  SpO2:  [92 %-97 %] 93 %  BP: ()/(47-71) 130/62     Weight: 102.1 kg (225 lb)  Body mass index is 38.62 kg/m².    Estimated Creatinine Clearance: 105.7 mL/min (based on SCr of 0.7 mg/dL).    Physical Exam   Constitutional: She is oriented to person, place, and time. She appears well-developed and well-nourished. No distress.   HENT:   Head: Normocephalic and atraumatic.   Eyes: Pupils are equal, round, and reactive to light.   Neck: Neck supple. Carotid bruit is not present. No thyromegaly present.   Cardiovascular: Normal rate and regular rhythm. Exam reveals no gallop.   No murmur heard.  Pulmonary/Chest: Effort normal and breath sounds normal. No respiratory distress. She has no wheezes.   Abdominal: Soft. Bowel sounds are normal. She exhibits no distension and no mass. There is no splenomegaly or hepatomegaly. There is no tenderness.   Musculoskeletal: Normal range of motion. She exhibits no edema or deformity.   Bandage to L hand C/D/I   Neurological: She is alert and oriented to person, place, and time. No cranial nerve deficit or sensory deficit.   Skin: Skin is warm and dry. No rash noted.   Psychiatric: She has a normal mood and affect.       Significant Labs:   Blood Culture: No results for input(s): LABBLOO in the last 4320 hours.  BMP:   Recent Labs   Lab 04/22/19  0502   *      K 3.8      CO2 24   BUN 12   CREATININE 0.7   CALCIUM 8.9   MG 1.8     CBC:   Recent Labs   Lab 04/21/19  0052 04/21/19  0600 04/22/19  0502   WBC 18.54* 18.08* 17.51*   HGB 13.8 13.2 12.3   HCT 42.4 40.1 37.6    278 285     CMP:   Recent Labs   Lab 04/21/19  0052 04/21/19  0600 04/22/19  0502    141 139   K 4.0 3.7 3.8    106 105   CO2 22* 24 24   * 103 115*   BUN 15 16 12   CREATININE 0.8 0.8 0.7   CALCIUM 8.9 9.5 8.9   PROT  --  7.3 6.6   ALBUMIN  --  3.9 3.3*   BILITOT  --  0.7 0.4   ALKPHOS  --  68  63   AST  --  32 15   ALT  --  28 20   ANIONGAP 13 11 10   EGFRNONAA >60.0 >60.0 >60.0     Wound Culture:   Recent Labs   Lab 04/21/19  0903   LABAERO No growth     All pertinent labs within the past 24 hours have been reviewed.    Significant Imaging: None

## 2019-04-22 NOTE — HPI
Patient is a 53 year old lady with no significant medical history.  She presents with left thumb pain following a cat bite on Friday.  Patient states that she was attempting to give medication to a  kitten when it bit her left thumb.  Since that time, she has noted increased pain and swelling to left thumb and fingers.  Pain radiates up her forearm.  It is worse with movement.  She took Aleve, which provided mild improvement.  She states she is otherwise in her usual state of health and denies chest pain, SOB, dizziness, palpitations, fever/chills, N/V/D, numbness, tingling.  Only past medical history is a ruptured spleen in the .  She does not smoke.  MRI concerning for tenosynovitis of flexor tendons.  S/P I and D - Ortho op note-a large pocket of purulent fluid within the flexor tendon sheath in the deep carpal tunnel compartment.  Cxs taken.    Pt reports not being up to date on her tetanus

## 2019-04-22 NOTE — ADDENDUM NOTE
Addendum  created 04/22/19 1240 by Marcus Sparrow MD    Order list changed, Order sets accessed

## 2019-04-22 NOTE — PROGRESS NOTES
At bedside with Deric Pope and Andrews seeing pt about pain.  States pain ongoing.  Noted that arm was on pillows but not above heart.  Assisted patient to elevate left arm higher with pillows above heart. Pt expressed that pain increased when hanging in stocking from IV pole.

## 2019-04-22 NOTE — PLAN OF CARE
Problem: Adult Inpatient Plan of Care  Goal: Plan of Care Review  Outcome: Ongoing (interventions implemented as appropriate)  POC reviewed with pt. Pt states understanding. AAOX4. No acute events overnight. Pt complained of left thumb numbness, notified med team A.  Pt tolerated pain meds. NAD, VSS. Bed locked and low, patient belongings and call light within reach. Wctm.

## 2019-04-23 PROBLEM — L03.114 CELLULITIS OF LEFT UPPER EXTREMITY: Status: ACTIVE | Noted: 2019-04-21

## 2019-04-23 PROBLEM — M65.142 SUPPURATIVE TENOSYNOVITIS OF FLEXOR TENDON OF LEFT HAND: Status: ACTIVE | Noted: 2019-04-23

## 2019-04-23 LAB
ALBUMIN SERPL BCP-MCNC: 3.2 G/DL (ref 3.5–5.2)
ALP SERPL-CCNC: 58 U/L (ref 55–135)
ALT SERPL W/O P-5'-P-CCNC: 15 U/L (ref 10–44)
ANION GAP SERPL CALC-SCNC: 7 MMOL/L (ref 8–16)
AST SERPL-CCNC: 13 U/L (ref 10–40)
BASOPHILS # BLD AUTO: 0.04 K/UL (ref 0–0.2)
BASOPHILS NFR BLD: 0.4 % (ref 0–1.9)
BILIRUB SERPL-MCNC: 0.3 MG/DL (ref 0.1–1)
BUN SERPL-MCNC: 13 MG/DL (ref 6–20)
CALCIUM SERPL-MCNC: 9 MG/DL (ref 8.7–10.5)
CHLORIDE SERPL-SCNC: 107 MMOL/L (ref 95–110)
CO2 SERPL-SCNC: 29 MMOL/L (ref 23–29)
CREAT SERPL-MCNC: 0.8 MG/DL (ref 0.5–1.4)
DIFFERENTIAL METHOD: ABNORMAL
EOSINOPHIL # BLD AUTO: 0.1 K/UL (ref 0–0.5)
EOSINOPHIL NFR BLD: 0.8 % (ref 0–8)
ERYTHROCYTE [DISTWIDTH] IN BLOOD BY AUTOMATED COUNT: 12.8 % (ref 11.5–14.5)
EST. GFR  (AFRICAN AMERICAN): >60 ML/MIN/1.73 M^2
EST. GFR  (NON AFRICAN AMERICAN): >60 ML/MIN/1.73 M^2
GLUCOSE SERPL-MCNC: 98 MG/DL (ref 70–110)
HCT VFR BLD AUTO: 37.9 % (ref 37–48.5)
HGB BLD-MCNC: 12.2 G/DL (ref 12–16)
IMM GRANULOCYTES # BLD AUTO: 0.03 K/UL (ref 0–0.04)
IMM GRANULOCYTES NFR BLD AUTO: 0.3 % (ref 0–0.5)
LYMPHOCYTES # BLD AUTO: 4.8 K/UL (ref 1–4.8)
LYMPHOCYTES NFR BLD: 44.4 % (ref 18–48)
MCH RBC QN AUTO: 30.8 PG (ref 27–31)
MCHC RBC AUTO-ENTMCNC: 32.2 G/DL (ref 32–36)
MCV RBC AUTO: 96 FL (ref 82–98)
MONOCYTES # BLD AUTO: 0.7 K/UL (ref 0.3–1)
MONOCYTES NFR BLD: 6.1 % (ref 4–15)
NEUTROPHILS # BLD AUTO: 5.2 K/UL (ref 1.8–7.7)
NEUTROPHILS NFR BLD: 48 % (ref 38–73)
NRBC BLD-RTO: 0 /100 WBC
PLATELET # BLD AUTO: 239 K/UL (ref 150–350)
PMV BLD AUTO: 9.6 FL (ref 9.2–12.9)
POTASSIUM SERPL-SCNC: 4.4 MMOL/L (ref 3.5–5.1)
PROT SERPL-MCNC: 6.4 G/DL (ref 6–8.4)
RBC # BLD AUTO: 3.96 M/UL (ref 4–5.4)
SODIUM SERPL-SCNC: 143 MMOL/L (ref 136–145)
WBC # BLD AUTO: 10.91 K/UL (ref 3.9–12.7)

## 2019-04-23 PROCEDURE — 36415 COLL VENOUS BLD VENIPUNCTURE: CPT

## 2019-04-23 PROCEDURE — 99232 PR SUBSEQUENT HOSPITAL CARE,LEVL II: ICD-10-PCS | Mod: ,,, | Performed by: HOSPITALIST

## 2019-04-23 PROCEDURE — 99233 SBSQ HOSP IP/OBS HIGH 50: CPT | Mod: ,,, | Performed by: PHYSICIAN ASSISTANT

## 2019-04-23 PROCEDURE — 63600175 PHARM REV CODE 636 W HCPCS: Performed by: PHYSICIAN ASSISTANT

## 2019-04-23 PROCEDURE — 80053 COMPREHEN METABOLIC PANEL: CPT

## 2019-04-23 PROCEDURE — 25000003 PHARM REV CODE 250: Performed by: STUDENT IN AN ORGANIZED HEALTH CARE EDUCATION/TRAINING PROGRAM

## 2019-04-23 PROCEDURE — 63600175 PHARM REV CODE 636 W HCPCS: Performed by: STUDENT IN AN ORGANIZED HEALTH CARE EDUCATION/TRAINING PROGRAM

## 2019-04-23 PROCEDURE — 25000242 PHARM REV CODE 250 ALT 637 W/ HCPCS: Performed by: PHYSICIAN ASSISTANT

## 2019-04-23 PROCEDURE — 94761 N-INVAS EAR/PLS OXIMETRY MLT: CPT

## 2019-04-23 PROCEDURE — 25000003 PHARM REV CODE 250: Performed by: HOSPITALIST

## 2019-04-23 PROCEDURE — 20600001 HC STEP DOWN PRIVATE ROOM

## 2019-04-23 PROCEDURE — 99233 PR SUBSEQUENT HOSPITAL CARE,LEVL III: ICD-10-PCS | Mod: ,,, | Performed by: PHYSICIAN ASSISTANT

## 2019-04-23 PROCEDURE — 94799 UNLISTED PULMONARY SVC/PX: CPT

## 2019-04-23 PROCEDURE — 94640 AIRWAY INHALATION TREATMENT: CPT

## 2019-04-23 PROCEDURE — 25000003 PHARM REV CODE 250: Performed by: PHYSICIAN ASSISTANT

## 2019-04-23 PROCEDURE — 99232 SBSQ HOSP IP/OBS MODERATE 35: CPT | Mod: ,,, | Performed by: HOSPITALIST

## 2019-04-23 PROCEDURE — 63600175 PHARM REV CODE 636 W HCPCS: Performed by: HOSPITALIST

## 2019-04-23 PROCEDURE — 85025 COMPLETE CBC W/AUTO DIFF WBC: CPT

## 2019-04-23 RX ORDER — POLYETHYLENE GLYCOL 3350 17 G/17G
17 POWDER, FOR SOLUTION ORAL DAILY
Qty: 510 G | Refills: 1 | Status: SHIPPED | OUTPATIENT
Start: 2019-04-24 | End: 2019-06-23

## 2019-04-23 RX ORDER — ALBUTEROL SULFATE 90 UG/1
2 AEROSOL, METERED RESPIRATORY (INHALATION) EVERY 6 HOURS PRN
COMMUNITY
Start: 2018-12-11 | End: 2019-05-01 | Stop reason: SDUPTHER

## 2019-04-23 RX ORDER — PREGABALIN 150 MG/1
150 CAPSULE ORAL NIGHTLY
Qty: 30 CAPSULE | Refills: 1 | Status: SHIPPED | OUTPATIENT
Start: 2019-04-23 | End: 2019-06-22

## 2019-04-23 RX ORDER — ACETAMINOPHEN 325 MG/1
650 TABLET ORAL 4 TIMES DAILY PRN
COMMUNITY
Start: 2019-04-23

## 2019-04-23 RX ORDER — SENNOSIDES 8.6 MG/1
1 TABLET ORAL 2 TIMES DAILY
COMMUNITY
Start: 2019-04-23

## 2019-04-23 RX ORDER — POLYETHYLENE GLYCOL 3350 17 G/17G
17 POWDER, FOR SOLUTION ORAL DAILY
Status: DISCONTINUED | OUTPATIENT
Start: 2019-04-23 | End: 2019-04-25 | Stop reason: HOSPADM

## 2019-04-23 RX ORDER — ACETAMINOPHEN 325 MG/1
TABLET ORAL
Status: DISPENSED
Start: 2019-04-23 | End: 2019-04-23

## 2019-04-23 RX ORDER — CELECOXIB 200 MG/1
200 CAPSULE ORAL DAILY
Qty: 22 CAPSULE | Refills: 0 | Status: SHIPPED | OUTPATIENT
Start: 2019-04-24 | End: 2019-05-17

## 2019-04-23 RX ORDER — OXYCODONE HYDROCHLORIDE 5 MG/1
5 TABLET ORAL EVERY 4 HOURS PRN
Qty: 60 TABLET | Refills: 0 | Status: SHIPPED | OUTPATIENT
Start: 2019-04-23 | End: 2019-05-05

## 2019-04-23 RX ADMIN — OXYCODONE HYDROCHLORIDE 5 MG: 5 TABLET ORAL at 07:04

## 2019-04-23 RX ADMIN — IPRATROPIUM BROMIDE AND ALBUTEROL SULFATE 3 ML: .5; 3 SOLUTION RESPIRATORY (INHALATION) at 01:04

## 2019-04-23 RX ADMIN — SENNOSIDES AND DOCUSATE SODIUM 1 TABLET: 8.6; 5 TABLET ORAL at 08:04

## 2019-04-23 RX ADMIN — MORPHINE SULFATE 2 MG: 2 INJECTION, SOLUTION INTRAMUSCULAR; INTRAVENOUS at 10:04

## 2019-04-23 RX ADMIN — CELECOXIB 200 MG: 200 CAPSULE ORAL at 08:04

## 2019-04-23 RX ADMIN — AMPICILLIN SODIUM AND SULBACTAM SODIUM 3 G: 2; 1 INJECTION, POWDER, FOR SOLUTION INTRAMUSCULAR; INTRAVENOUS at 08:04

## 2019-04-23 RX ADMIN — AMPICILLIN SODIUM AND SULBACTAM SODIUM 3 G: 2; 1 INJECTION, POWDER, FOR SOLUTION INTRAMUSCULAR; INTRAVENOUS at 02:04

## 2019-04-23 RX ADMIN — OXYCODONE HYDROCHLORIDE 10 MG: 10 TABLET ORAL at 09:04

## 2019-04-23 RX ADMIN — MORPHINE SULFATE 2 MG: 2 INJECTION, SOLUTION INTRAMUSCULAR; INTRAVENOUS at 12:04

## 2019-04-23 RX ADMIN — ACETAMINOPHEN 1000 MG: 325 TABLET ORAL at 11:04

## 2019-04-23 RX ADMIN — AMPICILLIN SODIUM AND SULBACTAM SODIUM 3 G: 2; 1 INJECTION, POWDER, FOR SOLUTION INTRAMUSCULAR; INTRAVENOUS at 01:04

## 2019-04-23 RX ADMIN — POLYETHYLENE GLYCOL 3350 17 G: 17 POWDER, FOR SOLUTION ORAL at 08:04

## 2019-04-23 RX ADMIN — ACETAMINOPHEN 1000 MG: 325 TABLET ORAL at 05:04

## 2019-04-23 RX ADMIN — PREGABALIN 150 MG: 150 CAPSULE ORAL at 08:04

## 2019-04-23 RX ADMIN — ACETAMINOPHEN 650 MG: 325 TABLET ORAL at 06:04

## 2019-04-23 RX ADMIN — MORPHINE SULFATE 2 MG: 2 INJECTION, SOLUTION INTRAMUSCULAR; INTRAVENOUS at 01:04

## 2019-04-23 NOTE — PROGRESS NOTES
[facility]  Orthopedics  Progress Note    Patient Name: Geneva Rodriguez  MRN: [id number]  Admission Date: 4/21/2019  Hospital Length of Stay: 2 days  Attending Provider: Ted Ovalle MD  Primary Care Provider: Talia Chambers MD  Follow-up For: Procedure(s) (LRB):  Incision and Drainage Hand (Left)  RELEASE, CARPAL TUNNEL (Left)  RELEASE, TRIGGER FINGER, THUMB    Post-Operative Day: 2 Days Post-Op  Subjective:     Principal Problem:Abscess of left hand    Principal Orthopedic Problem: same    Interval History: Patient seen and examined at bedside.   No acute events overnight.  Afebrile. Pain improved since yest however numbness of thumb extending from distal tip to entirety of thumb. Pt has not been elevating in stockinette.   Review of patient's allergies indicates:  No Known Allergies    Current Facility-Administered Medications   Medication    acetaminophen (TYLENOL) 325 MG tablet    acetaminophen tablet 1,000 mg    albuterol-ipratropium 2.5 mg-0.5 mg/3 mL nebulizer solution 3 mL    ampicillin-sulbactam 3 g in sodium chloride 0.9 % 100 mL IVPB (ready to mix system)    celecoxib capsule 200 mg    dextrose 50% injection 12.5 g    dextrose 50% injection 25 g    fluticasone 50 mcg/actuation nasal spray 100 mcg    glucagon (human recombinant) injection 1 mg    glucose chewable tablet 16 g    glucose chewable tablet 24 g    morphine injection 2 mg    morphine injection 2 mg    ondansetron injection 4 mg    oxyCODONE immediate release tablet 5 mg    oxyCODONE immediate release tablet Tab 10 mg    polyethylene glycol packet 17 g    pregabalin capsule 150 mg    promethazine (PHENERGAN) 6.25 mg in dextrose 5 % 50 mL IVPB    ramelteon tablet 8 mg    senna-docusate 8.6-50 mg per tablet 1 tablet    sodium chloride 0.9% flush 10 mL    Tdap vaccine injection 0.5 mL     Objective:     Vital Signs (Most Recent):  Temp: 97.5 °F (36.4 °C) (04/23/19 0800)  Pulse: 75 (04/23/19  "0800)  Resp: 18 (04/23/19 0800)  BP: (!) 135/58 (04/23/19 0800)  SpO2: 95 % (04/23/19 0800) Vital Signs (24h Range):  Temp:  [96.2 °F (35.7 °C)-97.7 °F (36.5 °C)] 97.5 °F (36.4 °C)  Pulse:  [74-86] 75  Resp:  [17-18] 18  SpO2:  [93 %-98 %] 95 %  BP: (100-135)/(55-67) 135/58     Weight: 102.1 kg (225 lb)  Height: 5' 4" (162.6 cm)  Body mass index is 38.62 kg/m².      Ortho/SPM Exam    Physical Exam:  NAD, A/O x 3.  Splint in place c/d/i- splint taken down- incisions with nylon sutures in place healing well with no s/s infection- no palpable fluid collection or TTP  Mild decrease in sensation  left thumb  BCR all digits      Significant Labs: All pertinent labs within the past 24 hours have been reviewed.    Significant Imaging: I have reviewed and interpreted all pertinent imaging results/findings.    Assessment/Plan:     * Abscess of left hand  Geneva Rodriguez is a 53 y.o. female with left hand abscess from cat bite s/p I&D 4/21/19  - IV unasyn- gram stain negative, cx pending- NGTD  - pain control per primary  - NWB LUE, keep LUE elevated at all times to minimize swelling  - WBC down to 10.91 this am  - pt WBC decreasing and overall pain improving, pt exam and history consistent with postop/neuropathic pain which is expected- will continue to monitor    Cellulitis                  Natalie Du MD  Orthopedics  [facility]  "

## 2019-04-23 NOTE — PROGRESS NOTES
[facility] Hospital Medicine                                                                     Progress Note     Team: [facility] A Ted Ovalle MD   Admit Date: 4/21/2019   Hospital Day: 2  YAKELIN: 4/25/2019   Code status: Full Code   Principal Problem: Abscess of left hand     SUMMARY:     Geneva Rodriguez is a 53 yom with no significant pmhx presenting post cat bite and cellulitis found to have left hand abscess sp I/D 4/21/19.       SUBJECTIVE:     Patient reports ongoing left hand pain, improving,  Trying to keep extremity elevated more frequently, reporting numbness from thumb into palmar aspect of hand.  Orthopedics evaluated and will monitor for need for possible repeat operation.     ID has given final IV abx recs for discharge.     ROS (Positive in Bold, otherwise negative)  Pain Scale: 2 /10   Constitutional: fever, chills, night sweats  CV: chest pain, edema, palpitations  Resp: SOB, cough, sputum production  GI: changes in appetite, NVDC, pain, melena, hematochezia, GERD, hematemesis  : Dysuria, hematuria, urinary urgency, frequency  MSK: arthralgia/myalgia, joint swelling  SKIN: rashes, pruritis, petechiae   Neuro/Psych: FND, anxiety, depression      OBJECTIVE:     Vitals:  Temp:  [96.2 °F (35.7 °C)-97.7 °F (36.5 °C)]   Pulse:  [72-86]   Resp:  [16-18]   BP: (100-135)/(51-60)   SpO2:  [95 %-98 %]      I & O (Last 24H):   No intake or output data in the 24 hours ending 04/23/19 1713      GEN:  female  in no acute distress. Nontoxic. Resting in bed. Cooperative.  HEENT: NCAT. PERRL. EOMI. Conjunctivae/corneas clear, sclera Anicteric.  CVS: RRR. Normal s1 s2 no murmur, click, rub or gallop  LUNG: CTAB. Normal respiratory effort. No wheezes, rhonchi, or crackles.  ABD: Normoactive BS, soft, NT, ND, no masses or organomegaly.  EXT: Left arm  splint in place, left hand fingers, erythema, wound dressing not taken down.   SKIN: color, texture, turgor normal. No rashes or lesions  NEURO: Alert, oriented x 4, Spont mvt of all extremities. Decreased sensation of distal left thumb noted.      All recent labs and imaging has been reviewed.     Recent Results (from the past 24 hour(s))   Comprehensive Metabolic Panel (CMP)    Collection Time: 04/23/19  5:26 AM   Result Value Ref Range    Sodium 143 136 - 145 mmol/L    Potassium 4.4 3.5 - 5.1 mmol/L    Chloride 107 95 - 110 mmol/L    CO2 29 23 - 29 mmol/L    Glucose 98 70 - 110 mg/dL    BUN, Bld 13 6 - 20 mg/dL    Creatinine 0.8 0.5 - 1.4 mg/dL    Calcium 9.0 8.7 - 10.5 mg/dL    Total Protein 6.4 6.0 - 8.4 g/dL    Albumin 3.2 (L) 3.5 - 5.2 g/dL    Total Bilirubin 0.3 0.1 - 1.0 mg/dL    Alkaline Phosphatase 58 55 - 135 U/L    AST 13 10 - 40 U/L    ALT 15 10 - 44 U/L    Anion Gap 7 (L) 8 - 16 mmol/L    eGFR if African American >60.0 >60 mL/min/1.73 m^2    eGFR if non African American >60.0 >60 mL/min/1.73 m^2   CBC with Automated Differential    Collection Time: 04/23/19  5:27 AM   Result Value Ref Range    WBC 10.91 3.90 - 12.70 K/uL    RBC 3.96 (L) 4.00 - 5.40 M/uL    Hemoglobin 12.2 12.0 - 16.0 g/dL    Hematocrit 37.9 37.0 - 48.5 %    MCV 96 82 - 98 fL    MCH 30.8 27.0 - 31.0 pg    MCHC 32.2 32.0 - 36.0 g/dL    RDW 12.8 11.5 - 14.5 %    Platelets 239 150 - 350 K/uL    MPV 9.6 9.2 - 12.9 fL    Immature Granulocytes 0.3 0.0 - 0.5 %    Gran # (ANC) 5.2 1.8 - 7.7 K/uL    Immature Grans (Abs) 0.03 0.00 - 0.04 K/uL    Lymph # 4.8 1.0 - 4.8 K/uL    Mono # 0.7 0.3 - 1.0 K/uL    Eos # 0.1 0.0 - 0.5 K/uL    Baso # 0.04 0.00 - 0.20 K/uL    nRBC 0 0 /100 WBC    Gran% 48.0 38.0 - 73.0 %    Lymph% 44.4 18.0 - 48.0 %    Mono% 6.1 4.0 - 15.0 %    Eosinophil% 0.8 0.0 - 8.0 %    Basophil% 0.4 0.0 - 1.9 %    Differential Method Automated        Active Hospital Problems    Diagnosis  POA    *Abscess of left hand [L02.512]  Yes      Priority: 1 - High    Cat bite of left thumb [S61.052A, W55.01XA]  Yes     Priority: 2     Cellulitis of left upper extremity [L03.114]  Yes     Priority: 3       Resolved Hospital Problems   No resolved problems to display.          ASSESSMENT AND PLAN:       Abscess of the left hand with cellulitis secondary to cat bite  - As per MRI  -ID recs stopping Unasyn, starting Ceftriaxone and continue on discharge  Will order Midline for patient and home IV abx will need to be setup for tomorrow.  If no further operative intervention, plan to d/c on IV Ceftriaxone through 5/05/19 per ID for empiric therapy     - S/p I&D now with splint 4/21/19 -- NWB LUE and keep elevated at all times to minimize swelling  - Wound cultures NGTD  - Leukocytosis  resolved  - Analgesics PRN ordered  - ID consulted   - Ortho following  - Remains HDS and afebrile       Prophylaxis- SCDs    Code Status- Full Code     Discharge plan and follow up - d/c home once medically stable        Ted Ovalle M.D.  Attending Physician  [facility] Medicine Dept.  Pager: [phone]  Spectralink -x [phone]

## 2019-04-23 NOTE — PLAN OF CARE
[facility]    HOME HEALTH ORDERS  FACE TO FACE ENCOUNTER    Patient Name: Geneva Rodriguez  YOB: 1965    PCP: Talia Chambers MD   PCP Address: [facility] / [place] [address]  PCP Phone Number: [phone]  PCP Fax: [phone]    Encounter Date: 04/23/2019    Admit to Home Health    Diagnoses:  Active Hospital Problems    Diagnosis  POA    *Abscess of left hand [L02.512]  Yes     Priority: 1 - High    Cat bite of left thumb [S61.052A, W55.01XA]  Yes     Priority: 2     Cellulitis of left upper extremity [L03.114]  Yes     Priority: 3       Resolved Hospital Problems   No resolved problems to display.       Future Appointments   Date Time Provider Department Center   4/25/2019  2:45 PM Talia Chambers MD [facility]   5/6/2019  8:45 AM Tess Armando PA-C [facility]     Follow-up Information     Talia Chambers MD On 4/25/2019.    Specialty:  Internal Medicine  Why:  Hospital Follow up: 2:45pm  Contact information:  [address]  [place] [address]  [phone]                     I have seen and examined this patient face to face today. My clinical findings that support the need for the home health skilled services and home bound status are the following:  Medical restrictions requiring assistance of another human to leave home due to  IV infusion Needs.    Allergies:Review of patient's allergies indicates:  No Known Allergies    Diet: regular diet    Activities: activity as tolerated and NON WEIGHT BEARING TO LEFT UPPER EXTREMITY    Nursing:   SN to complete comprehensive assessment including routine vital signs. Instruct on disease process and s/s of complications to report to MD. Review/verify medication list sent home with the patient at time of discharge  and instruct patient/caregiver as needed. Frequency may be adjusted depending on start of care date.    Notify MD if SBP > 160 or < 90; DBP > 90 or < 50; HR > 120 or < 50;  Temp > 101; Other:         CONSULTS:    Physical Therapy to evaluate and treat. Evaluate for home safety and equipment needs; Establish/upgrade home exercise program. Perform / instruct on therapeutic exercises, gait training, transfer training, and Range of Motion.  Occupational Therapy to evaluate and treat. Evaluate home environment for safety and equipment needs. Perform/Instruct on transfers, ADL training, ROM, and therapeutic exercises.    MISCELLANEOUS CARE:  Home Infusion Therapy:   SN to perform Infusion Therapy/Central Line Care.  Review Central Line Care & Central Line Flush with patient.    Administer (drug and dose): CEFTRIAXONE 2,000 MG IV EVERY 24 HOURS      Last dose given: 4/24/19 @ 06:00                       Home dose due: 04/25/19 @ 06:00    END DATE - 05/05/19 AFTER AM DOSE    Scrub the Hub: Prior to accessing the line, always perform a 30 second alcohol scrub  Each lumen of the central line is to be flushed at least daily with 10 mL Normal Saline and 3 mL Heparin flush (10 units/mL)  Skilled Nurse (SN) may draw blood from IV access  Blood Draw Procedure:   - Aspirate at least 5 mL of blood   - Discard   - Obtain specimen   - Change injection cap   - Flush with 20 mL Normal Saline followed by a                 3-5 mL Heparin flush (10 units/mL)  Central :   - Sterile dressing changes are done weekly and as needed.   - Use chlor-hexadine scrub to cleanse site, apply Biopatch to insertion site,       apply securement device dressing   - Injection caps are changed weekly and after EVERY lab draw.   - If sterile gauze is under dressing to control oozing,                 dressing change must be performed every 24 hours until gauze is not needed.    WOUND CARE ORDERS  yes:  Surgical Wound:  Location: LEFT HAND     Consult ET nurse        Apply the following to wound:   Other: PENDING FINAL SURGICAL RECOMMENDATIONS (frequency)      Medications: Review discharge medications with patient  and family and provide education.      Current Discharge Medication List      START taking these medications    Details   acetaminophen (TYLENOL) 325 MG tablet Take 2 tablets (650 mg total) by mouth 4 (four) times daily as needed for Pain.      cefTRIAXone 2 g in dextrose 5 % 50 mL (ROCEPHIN) 2 g/50 mL PgBk IVPB Inject 50 mLs (2 g total) into the vein Daily. END DATE 5/5/19 for 12 days      celecoxib (CELEBREX) 200 MG capsule Take 1 capsule (200 mg total) by mouth once daily. for 22 days  Qty: 22 capsule, Refills: 0      oxyCODONE (ROXICODONE) 5 MG immediate release tablet Take 1 tablet (5 mg total) by mouth every 4 (four) hours as needed (LEFT HAND PAIN).  Qty: 60 tablet, Refills: 0      polyethylene glycol (GLYCOLAX) 17 gram PwPk Take 17 g by mouth once daily.  Qty: 30 each, Refills: 1      pregabalin (LYRICA) 150 MG capsule Take 1 capsule (150 mg total) by mouth every evening.  Qty: 30 capsule, Refills: 1      senna (SENNA) 8.6 mg tablet Take 1 tablet by mouth 2 (two) times daily. WHILE TAKING OPIATE PAIN MEDICATION         CONTINUE these medications which have NOT CHANGED    Details   albuterol (PROVENTIL HFA) 90 mcg/actuation inhaler Inhale 2 puffs into the lungs every 6 (six) hours as needed for Wheezing.             I certify that this patient is confined to her home and needs intermittent skilled nursing care.

## 2019-04-23 NOTE — ASSESSMENT & PLAN NOTE
Patient is a 53 year old lady with no significant medical history.  She presents with left thumb pain following a cat bite on Friday.  MRI concerning for tenosynovitis of flexor tendons.  S/P I and D 4/21- Ortho op note-a large pocket of purulent fluid within the flexor tendon sheath in the deep carpal tunnel compartment.  Cxs taken.    Pt reports not being up to date on her tetanus    Plan  - Can discontinue Unasyn.  - Start pt on Ceftriaxone 2g q24h  for outpt abx  - Tdap ordered. Pt will need Tdap administered.  - Pt will need 2 weeks IV therapy from I and D date.  (Est end date 5/5/19)  - ID f/u in 2 weeks  - ID will sign off.

## 2019-04-23 NOTE — PT/OT/SLP PROGRESS
Physical Therapy      Patient Name:  Geneva Rodriguez   MRN:  [id number]    Patient not seen today secondary to Other (Comment)(screen performed, patient ambulating independently, reports that she is at her functional baseline). Patient newman not require skilled PT at this time, PT orders acknowledged and discharged 4/23/2019.     Delia Wolf, PT

## 2019-04-23 NOTE — PROGRESS NOTES
[facility]  Infectious Disease  Progress Note    Patient Name: Geneva Rodriguez  MRN: [id number]  Admission Date: 2019  Length of Stay: 2 days  Attending Physician: Ted Ovalle MD  Primary Care Provider: Talia Chambers MD    Isolation Status: No active isolations  Assessment/Plan:      * Abscess of left hand  Patient is a 53 year old lady with no significant medical history.  She presents with left thumb pain following a cat bite on Friday.  MRI concerning for tenosynovitis of flexor tendons.  S/P I and D - Ortho op note-a large pocket of purulent fluid within the flexor tendon sheath in the deep carpal tunnel compartment.  Cxs taken.    Pt reports not being up to date on her tetanus    Plan  - Can discontinue Unasyn.  - Start pt on Ceftriaxone 2g q24h  for outpt abx  - Tdap ordered. Pt will need Tdap administered.  - Pt will need 2 weeks IV therapy from I and D date.  (Est end date 19)  - ID f/u in 2 weeks  - ID will sign off.        Thank you for your consult. I will sign off. Please contact us if you have any additional questions.    Harvinder Son PA-C  Infectious Disease  [facility]    Subjective:     Principal Problem:Abscess of left hand    HPI: Patient is a 53 year old lady with no significant medical history.  She presents with left thumb pain following a cat bite on Friday.  Patient states that she was attempting to give medication to a  kitten when it bit her left thumb.  Since that time, she has noted increased pain and swelling to left thumb and fingers.  Pain radiates up her forearm.  It is worse with movement.  She took Aleve, which provided mild improvement.  She states she is otherwise in her usual state of health and denies chest pain, SOB, dizziness, palpitations, fever/chills, N/V/D, numbness, tingling.  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  She does not smoke.  MRI concerning for tenosynovitis of  flexor tendons.  S/P I and D 4/21- Ortho op note-a large pocket of purulent fluid within the flexor tendon sheath in the deep carpal tunnel compartment.  Cxs taken.    Pt reports not being up to date on her tetanus    Interval History: Pt without complains.  Cxs-w/o growth.  Afebrile w/o leukocytosis    Review of Systems   Constitutional: Negative for activity change, appetite change, chills, fatigue, fever and unexpected weight change.   HENT: Negative for congestion, rhinorrhea, sore throat, trouble swallowing and voice change.    Eyes: Negative for visual disturbance.   Respiratory: Negative for cough, choking, chest tightness, shortness of breath and wheezing.    Cardiovascular: Negative for chest pain, palpitations and leg swelling.   Gastrointestinal: Negative for abdominal distention, abdominal pain, anal bleeding, blood in stool, constipation, diarrhea, nausea and vomiting.   Endocrine: Negative for cold intolerance, heat intolerance, polydipsia and polyuria.   Genitourinary: Negative for dysuria, flank pain, frequency, hematuria and urgency.   Musculoskeletal: Negative for arthralgias, back pain, joint swelling and myalgias.        Pain and swelling to L thumb   Skin: Negative for color change and rash.   Neurological: Negative for dizziness, seizures, syncope, facial asymmetry, speech difficulty, weakness, light-headedness, numbness and headaches.   Hematological: Negative for adenopathy. Does not bruise/bleed easily.   Psychiatric/Behavioral: Negative for agitation, confusion, hallucinations and suicidal ideas.     Objective:     Vital Signs (Most Recent):  Temp: 97.5 °F (36.4 °C) (04/23/19 1111)  Pulse: 72 (04/23/19 1200)  Resp: 16 (04/23/19 1200)  BP: (!) 117/51 (04/23/19 1111)  SpO2: 97 % (04/23/19 1200) Vital Signs (24h Range):  Temp:  [96.2 °F (35.7 °C)-97.7 °F (36.5 °C)] 97.5 °F (36.4 °C)  Pulse:  [72-86] 72  Resp:  [16-18] 16  SpO2:  [93 %-98 %] 97 %  BP: (100-135)/(51-62) 117/51     Weight: 102.1  kg (225 lb)  Body mass index is 38.62 kg/m².    Estimated Creatinine Clearance: 92.4 mL/min (based on SCr of 0.8 mg/dL).    Physical Exam   Constitutional: She is oriented to person, place, and time. She appears well-developed and well-nourished. No distress.   HENT:   Head: Normocephalic and atraumatic.   Eyes: Pupils are equal, round, and reactive to light.   Neck: Neck supple. Carotid bruit is not present. No thyromegaly present.   Cardiovascular: Normal rate and regular rhythm. Exam reveals no gallop.   No murmur heard.  Pulmonary/Chest: Effort normal and breath sounds normal. No respiratory distress. She has no wheezes.   Abdominal: Soft. Bowel sounds are normal. She exhibits no distension and no mass. There is no splenomegaly or hepatomegaly. There is no tenderness.   Musculoskeletal: Normal range of motion. She exhibits no edema or deformity.   Bandage to L hand C/D/I   Neurological: She is alert and oriented to person, place, and time. No cranial nerve deficit or sensory deficit.   Skin: Skin is warm and dry. No rash noted.   Psychiatric: She has a normal mood and affect.       Significant Labs:   Blood Culture: No results for input(s): LABBLOO in the last 4320 hours.  BMP:   Recent Labs   Lab 04/22/19  0502 04/23/19  0526   * 98    143   K 3.8 4.4    107   CO2 24 29   BUN 12 13   CREATININE 0.7 0.8   CALCIUM 8.9 9.0   MG 1.8  --      CBC:   Recent Labs   Lab 04/22/19  0502 04/23/19  0527   WBC 17.51* 10.91   HGB 12.3 12.2   HCT 37.6 37.9    239     CMP:   Recent Labs   Lab 04/22/19  0502 04/23/19  0526    143   K 3.8 4.4    107   CO2 24 29   * 98   BUN 12 13   CREATININE 0.7 0.8   CALCIUM 8.9 9.0   PROT 6.6 6.4   ALBUMIN 3.3* 3.2*   BILITOT 0.4 0.3   ALKPHOS 63 58   AST 15 13   ALT 20 15   ANIONGAP 10 7*   EGFRNONAA >60.0 >60.0     Microbiology Results (last 7 days)     Procedure Component Value Units Date/Time    AFB Culture & Smear [[id number]] Collected:   04/21/19 0903    Order Status:  Completed Specimen:  Abscess from Hand, Left Updated:  04/22/19 2127     AFB Culture & Smear Culture in progress     AFB CULTURE STAIN No acid fast bacilli seen.    Aerobic culture [[id number]] Collected:  04/21/19 0903    Order Status:  Completed Specimen:  Abscess from Hand, Left Updated:  04/22/19 0737     Aerobic Bacterial Culture No growth    Culture, Anaerobic [[id number]] Collected:  04/21/19 0903    Order Status:  Completed Specimen:  Abscess from Hand, Left Updated:  04/22/19 0726     Anaerobic Culture Culture in progress    Gram stain [[id number]] Collected:  04/21/19 0903    Order Status:  Completed Specimen:  Abscess from Hand, Left Updated:  04/21/19 1154     Gram Stain Result Moderate WBC's      No organisms seen    Fungus culture [[id number]] Collected:  04/21/19 0903    Order Status:  Sent Specimen:  Abscess from Hand, Left Updated:  04/21/19 0954        Wound Culture:   Recent Labs   Lab 04/21/19 0903   LABAERO No growth     All pertinent labs within the past 24 hours have been reviewed.    Significant Imaging: I have reviewed all pertinent imaging results/findings within the past 24 hours.

## 2019-04-23 NOTE — SUBJECTIVE & OBJECTIVE
"Principal Problem:Abscess of left hand    Principal Orthopedic Problem: same    Interval History: Patient seen and examined at bedside.   No acute events overnight.  Afebrile. Pain improved since yest however numbness of thumb extending from distal tip to entirety of thumb. Pt has not been elevating in stockinette.   Review of patient's allergies indicates:  No Known Allergies    Current Facility-Administered Medications   Medication    acetaminophen (TYLENOL) 325 MG tablet    acetaminophen tablet 1,000 mg    albuterol-ipratropium 2.5 mg-0.5 mg/3 mL nebulizer solution 3 mL    ampicillin-sulbactam 3 g in sodium chloride 0.9 % 100 mL IVPB (ready to mix system)    celecoxib capsule 200 mg    dextrose 50% injection 12.5 g    dextrose 50% injection 25 g    fluticasone 50 mcg/actuation nasal spray 100 mcg    glucagon (human recombinant) injection 1 mg    glucose chewable tablet 16 g    glucose chewable tablet 24 g    morphine injection 2 mg    morphine injection 2 mg    ondansetron injection 4 mg    oxyCODONE immediate release tablet 5 mg    oxyCODONE immediate release tablet Tab 10 mg    polyethylene glycol packet 17 g    pregabalin capsule 150 mg    promethazine (PHENERGAN) 6.25 mg in dextrose 5 % 50 mL IVPB    ramelteon tablet 8 mg    senna-docusate 8.6-50 mg per tablet 1 tablet    sodium chloride 0.9% flush 10 mL    Tdap vaccine injection 0.5 mL     Objective:     Vital Signs (Most Recent):  Temp: 97.5 °F (36.4 °C) (04/23/19 0800)  Pulse: 75 (04/23/19 0800)  Resp: 18 (04/23/19 0800)  BP: (!) 135/58 (04/23/19 0800)  SpO2: 95 % (04/23/19 0800) Vital Signs (24h Range):  Temp:  [96.2 °F (35.7 °C)-97.7 °F (36.5 °C)] 97.5 °F (36.4 °C)  Pulse:  [74-86] 75  Resp:  [17-18] 18  SpO2:  [93 %-98 %] 95 %  BP: (100-135)/(55-67) 135/58     Weight: 102.1 kg (225 lb)  Height: 5' 4" (162.6 cm)  Body mass index is 38.62 kg/m².      Ortho/SPM Exam    Physical Exam:  NAD, A/O x 3.  Splint in place c/d/i- splint " taken down- incisions with nylon sutures in place healing well with no s/s infection- no palpable fluid collection or TTP  Mild decrease in sensation  left thumb  BCR all digits      Significant Labs: All pertinent labs within the past 24 hours have been reviewed.    Significant Imaging: I have reviewed and interpreted all pertinent imaging results/findings.

## 2019-04-23 NOTE — SUBJECTIVE & OBJECTIVE
Interval History: Pt without complains.  Cxs-w/o growth.  Afebrile w/o leukocytosis    Review of Systems   Constitutional: Negative for activity change, appetite change, chills, fatigue, fever and unexpected weight change.   HENT: Negative for congestion, rhinorrhea, sore throat, trouble swallowing and voice change.    Eyes: Negative for visual disturbance.   Respiratory: Negative for cough, choking, chest tightness, shortness of breath and wheezing.    Cardiovascular: Negative for chest pain, palpitations and leg swelling.   Gastrointestinal: Negative for abdominal distention, abdominal pain, anal bleeding, blood in stool, constipation, diarrhea, nausea and vomiting.   Endocrine: Negative for cold intolerance, heat intolerance, polydipsia and polyuria.   Genitourinary: Negative for dysuria, flank pain, frequency, hematuria and urgency.   Musculoskeletal: Negative for arthralgias, back pain, joint swelling and myalgias.        Pain and swelling to L thumb   Skin: Negative for color change and rash.   Neurological: Negative for dizziness, seizures, syncope, facial asymmetry, speech difficulty, weakness, light-headedness, numbness and headaches.   Hematological: Negative for adenopathy. Does not bruise/bleed easily.   Psychiatric/Behavioral: Negative for agitation, confusion, hallucinations and suicidal ideas.     Objective:     Vital Signs (Most Recent):  Temp: 97.5 °F (36.4 °C) (04/23/19 1111)  Pulse: 72 (04/23/19 1200)  Resp: 16 (04/23/19 1200)  BP: (!) 117/51 (04/23/19 1111)  SpO2: 97 % (04/23/19 1200) Vital Signs (24h Range):  Temp:  [96.2 °F (35.7 °C)-97.7 °F (36.5 °C)] 97.5 °F (36.4 °C)  Pulse:  [72-86] 72  Resp:  [16-18] 16  SpO2:  [93 %-98 %] 97 %  BP: (100-135)/(51-62) 117/51     Weight: 102.1 kg (225 lb)  Body mass index is 38.62 kg/m².    Estimated Creatinine Clearance: 92.4 mL/min (based on SCr of 0.8 mg/dL).    Physical Exam   Constitutional: She is oriented to person, place, and time. She appears  well-developed and well-nourished. No distress.   HENT:   Head: Normocephalic and atraumatic.   Eyes: Pupils are equal, round, and reactive to light.   Neck: Neck supple. Carotid bruit is not present. No thyromegaly present.   Cardiovascular: Normal rate and regular rhythm. Exam reveals no gallop.   No murmur heard.  Pulmonary/Chest: Effort normal and breath sounds normal. No respiratory distress. She has no wheezes.   Abdominal: Soft. Bowel sounds are normal. She exhibits no distension and no mass. There is no splenomegaly or hepatomegaly. There is no tenderness.   Musculoskeletal: Normal range of motion. She exhibits no edema or deformity.   Bandage to L hand C/D/I   Neurological: She is alert and oriented to person, place, and time. No cranial nerve deficit or sensory deficit.   Skin: Skin is warm and dry. No rash noted.   Psychiatric: She has a normal mood and affect.       Significant Labs:   Blood Culture: No results for input(s): LABBLOO in the last 4320 hours.  BMP:   Recent Labs   Lab 04/22/19  0502 04/23/19  0526   * 98    143   K 3.8 4.4    107   CO2 24 29   BUN 12 13   CREATININE 0.7 0.8   CALCIUM 8.9 9.0   MG 1.8  --      CBC:   Recent Labs   Lab 04/22/19  0502 04/23/19  0527   WBC 17.51* 10.91   HGB 12.3 12.2   HCT 37.6 37.9    239     CMP:   Recent Labs   Lab 04/22/19  0502 04/23/19  0526    143   K 3.8 4.4    107   CO2 24 29   * 98   BUN 12 13   CREATININE 0.7 0.8   CALCIUM 8.9 9.0   PROT 6.6 6.4   ALBUMIN 3.3* 3.2*   BILITOT 0.4 0.3   ALKPHOS 63 58   AST 15 13   ALT 20 15   ANIONGAP 10 7*   EGFRNONAA >60.0 >60.0     Microbiology Results (last 7 days)     Procedure Component Value Units Date/Time    AFB Culture & Smear [[id number]] Collected:  04/21/19 0903    Order Status:  Completed Specimen:  Abscess from Hand, Left Updated:  04/22/19 2127     AFB Culture & Smear Culture in progress     AFB CULTURE STAIN No acid fast bacilli seen.    Aerobic culture  [[id number]] Collected:  04/21/19 0903    Order Status:  Completed Specimen:  Abscess from Hand, Left Updated:  04/22/19 0737     Aerobic Bacterial Culture No growth    Culture, Anaerobic [[id number]] Collected:  04/21/19 0903    Order Status:  Completed Specimen:  Abscess from Hand, Left Updated:  04/22/19 0726     Anaerobic Culture Culture in progress    Gram stain [[id number]] Collected:  04/21/19 0903    Order Status:  Completed Specimen:  Abscess from Hand, Left Updated:  04/21/19 1154     Gram Stain Result Moderate WBC's      No organisms seen    Fungus culture [[id number]] Collected:  04/21/19 0903    Order Status:  Sent Specimen:  Abscess from Hand, Left Updated:  04/21/19 0954        Wound Culture:   Recent Labs   Lab 04/21/19 0903   LABAERO No growth     All pertinent labs within the past 24 hours have been reviewed.    Significant Imaging: I have reviewed all pertinent imaging results/findings within the past 24 hours.

## 2019-04-23 NOTE — ASSESSMENT & PLAN NOTE
Geneva Rodriguez is a 53 y.o. female with left hand abscess from cat bite s/p I&D 4/21/19  - IV unasyn- gram stain negative, cx pending- NGTD  - pain control per primary  - NWB LUE, keep LUE elevated at all times to minimize swelling  - WBC down to 10.91 this am  - pt WBC decreasing and overall pain improving, will continue to monitor. Make NPO at midnight as a precaution

## 2019-04-23 NOTE — NURSING
Pt O2 saturation was between 94-96% during vital signs. Pt complained that the room air was thick. Respiratory was paged. Respiratory therapist said she would be on her way after dealing with her current patient.

## 2019-04-23 NOTE — PLAN OF CARE
Problem: Adult Inpatient Plan of Care  Goal: Plan of Care Review  Outcome: Ongoing (interventions implemented as appropriate)  Patient is AAO x4. POC reviewed with patient. Questions encouraged and answered. Patient complained of numbness in her left thumb. Fluticasone was ordered for patient due to congestion. Patient did find some relief with med. Lungs clear, even chest expansion, breathing unlabored. Scheduled tylenol exceeded the 3,000 mg per 24hour. Other pain meds were given instead. 650mg was given in place of 1000mg dose for 6:00am. Patient tolerated meds well. Call light within reach, side rails up x2, bed in lowest position. No complaints or evidence of distress. WCTM.

## 2019-04-24 ENCOUNTER — TELEPHONE (OUTPATIENT)
Dept: ORTHOPEDICS | Facility: CLINIC | Age: 54
End: 2019-04-24

## 2019-04-24 LAB
ALBUMIN SERPL BCP-MCNC: 3.4 G/DL (ref 3.5–5.2)
ALP SERPL-CCNC: 62 U/L (ref 55–135)
ALT SERPL W/O P-5'-P-CCNC: 18 U/L (ref 10–44)
ANION GAP SERPL CALC-SCNC: 7 MMOL/L (ref 8–16)
ANISOCYTOSIS BLD QL SMEAR: SLIGHT
AST SERPL-CCNC: 14 U/L (ref 10–40)
BACTERIA SPEC AEROBE CULT: NO GROWTH
BASOPHILS # BLD AUTO: 0.05 K/UL (ref 0–0.2)
BASOPHILS NFR BLD: 0.5 % (ref 0–1.9)
BILIRUB SERPL-MCNC: 0.2 MG/DL (ref 0.1–1)
BUN SERPL-MCNC: 17 MG/DL (ref 6–20)
CALCIUM SERPL-MCNC: 9.4 MG/DL (ref 8.7–10.5)
CHLORIDE SERPL-SCNC: 104 MMOL/L (ref 95–110)
CO2 SERPL-SCNC: 30 MMOL/L (ref 23–29)
CREAT SERPL-MCNC: 0.9 MG/DL (ref 0.5–1.4)
DIFFERENTIAL METHOD: ABNORMAL
EOSINOPHIL # BLD AUTO: 0.2 K/UL (ref 0–0.5)
EOSINOPHIL NFR BLD: 1.7 % (ref 0–8)
ERYTHROCYTE [DISTWIDTH] IN BLOOD BY AUTOMATED COUNT: 12.6 % (ref 11.5–14.5)
EST. GFR  (AFRICAN AMERICAN): >60 ML/MIN/1.73 M^2
EST. GFR  (NON AFRICAN AMERICAN): >60 ML/MIN/1.73 M^2
GLUCOSE SERPL-MCNC: 99 MG/DL (ref 70–110)
HCT VFR BLD AUTO: 39.3 % (ref 37–48.5)
HGB BLD-MCNC: 12.4 G/DL (ref 12–16)
HYPOCHROMIA BLD QL SMEAR: ABNORMAL
IMM GRANULOCYTES # BLD AUTO: 0.04 K/UL (ref 0–0.04)
IMM GRANULOCYTES NFR BLD AUTO: 0.4 % (ref 0–0.5)
LYMPHOCYTES # BLD AUTO: 5 K/UL (ref 1–4.8)
LYMPHOCYTES NFR BLD: 46 % (ref 18–48)
MCH RBC QN AUTO: 30.3 PG (ref 27–31)
MCHC RBC AUTO-ENTMCNC: 31.6 G/DL (ref 32–36)
MCV RBC AUTO: 96 FL (ref 82–98)
MONOCYTES # BLD AUTO: 0.7 K/UL (ref 0.3–1)
MONOCYTES NFR BLD: 6.7 % (ref 4–15)
NEUTROPHILS # BLD AUTO: 4.9 K/UL (ref 1.8–7.7)
NEUTROPHILS NFR BLD: 44.7 % (ref 38–73)
NRBC BLD-RTO: 0 /100 WBC
OVALOCYTES BLD QL SMEAR: ABNORMAL
PLATELET # BLD AUTO: 276 K/UL (ref 150–350)
PMV BLD AUTO: 9.9 FL (ref 9.2–12.9)
POIKILOCYTOSIS BLD QL SMEAR: SLIGHT
POLYCHROMASIA BLD QL SMEAR: ABNORMAL
POTASSIUM SERPL-SCNC: 4.1 MMOL/L (ref 3.5–5.1)
PROT SERPL-MCNC: 6.8 G/DL (ref 6–8.4)
RBC # BLD AUTO: 4.09 M/UL (ref 4–5.4)
SODIUM SERPL-SCNC: 141 MMOL/L (ref 136–145)
WBC # BLD AUTO: 10.81 K/UL (ref 3.9–12.7)

## 2019-04-24 PROCEDURE — 25000003 PHARM REV CODE 250: Performed by: HOSPITALIST

## 2019-04-24 PROCEDURE — 99232 SBSQ HOSP IP/OBS MODERATE 35: CPT | Mod: ,,, | Performed by: HOSPITALIST

## 2019-04-24 PROCEDURE — 36415 COLL VENOUS BLD VENIPUNCTURE: CPT

## 2019-04-24 PROCEDURE — 63600175 PHARM REV CODE 636 W HCPCS: Performed by: HOSPITALIST

## 2019-04-24 PROCEDURE — C1751 CATH, INF, PER/CENT/MIDLINE: HCPCS

## 2019-04-24 PROCEDURE — 76937 US GUIDE VASCULAR ACCESS: CPT

## 2019-04-24 PROCEDURE — 25000003 PHARM REV CODE 250: Performed by: PHYSICIAN ASSISTANT

## 2019-04-24 PROCEDURE — 99232 PR SUBSEQUENT HOSPITAL CARE,LEVL II: ICD-10-PCS | Mod: ,,, | Performed by: HOSPITALIST

## 2019-04-24 PROCEDURE — 63600175 PHARM REV CODE 636 W HCPCS: Performed by: STUDENT IN AN ORGANIZED HEALTH CARE EDUCATION/TRAINING PROGRAM

## 2019-04-24 PROCEDURE — 20600001 HC STEP DOWN PRIVATE ROOM

## 2019-04-24 PROCEDURE — 36410 VNPNXR 3YR/> PHY/QHP DX/THER: CPT

## 2019-04-24 PROCEDURE — 25000003 PHARM REV CODE 250: Performed by: STUDENT IN AN ORGANIZED HEALTH CARE EDUCATION/TRAINING PROGRAM

## 2019-04-24 PROCEDURE — 85025 COMPLETE CBC W/AUTO DIFF WBC: CPT

## 2019-04-24 PROCEDURE — 80053 COMPREHEN METABOLIC PANEL: CPT

## 2019-04-24 RX ADMIN — PREGABALIN 150 MG: 150 CAPSULE ORAL at 07:04

## 2019-04-24 RX ADMIN — CELECOXIB 200 MG: 200 CAPSULE ORAL at 09:04

## 2019-04-24 RX ADMIN — CEFTRIAXONE 2 G: 2 INJECTION, SOLUTION INTRAVENOUS at 05:04

## 2019-04-24 RX ADMIN — MORPHINE SULFATE 2 MG: 2 INJECTION, SOLUTION INTRAMUSCULAR; INTRAVENOUS at 01:04

## 2019-04-24 RX ADMIN — SENNOSIDES AND DOCUSATE SODIUM 1 TABLET: 8.6; 5 TABLET ORAL at 07:04

## 2019-04-24 RX ADMIN — SENNOSIDES AND DOCUSATE SODIUM 1 TABLET: 8.6; 5 TABLET ORAL at 09:04

## 2019-04-24 RX ADMIN — POLYETHYLENE GLYCOL 3350 17 G: 17 POWDER, FOR SOLUTION ORAL at 09:04

## 2019-04-24 RX ADMIN — ACETAMINOPHEN 1000 MG: 325 TABLET ORAL at 05:04

## 2019-04-24 RX ADMIN — MORPHINE SULFATE 2 MG: 2 INJECTION, SOLUTION INTRAMUSCULAR; INTRAVENOUS at 04:04

## 2019-04-24 RX ADMIN — OXYCODONE HYDROCHLORIDE 10 MG: 10 TABLET ORAL at 09:04

## 2019-04-24 RX ADMIN — MORPHINE SULFATE 2 MG: 2 INJECTION, SOLUTION INTRAMUSCULAR; INTRAVENOUS at 09:04

## 2019-04-24 RX ADMIN — RAMELTEON 8 MG: 8 TABLET, FILM COATED ORAL at 07:04

## 2019-04-24 RX ADMIN — ACETAMINOPHEN 1000 MG: 325 TABLET ORAL at 12:04

## 2019-04-24 RX ADMIN — OXYCODONE HYDROCHLORIDE 10 MG: 10 TABLET ORAL at 07:04

## 2019-04-24 NOTE — NURSING
Usha MORAES, spoke with . Informed Dr of patients numbness in thumb and NPO orders that were cancelled during day day shift.   called back. Dr. Robin responded that team is aware of constant numbness, and the NPO orders were rescinded because no procedures are going to done wherewith the patient needs to npo.

## 2019-04-24 NOTE — TELEPHONE ENCOUNTER
VM left to notify pt of appt w/RR cancelled and appt scheduled for [facility]. Appt details left on VM. Appt slip in mail to pt.

## 2019-04-24 NOTE — PLAN OF CARE
Problem: Adult Inpatient Plan of Care  Goal: Plan of Care Review  Outcome: Ongoing (interventions implemented as appropriate)  Patient AAO x4. POC reviewed with patient, pt verbalized understanding. Questions encouraged and answers. Pt complained of pain and constant numbness in her thumb through out the shift. Patient was in tears due to pain. Patient eventually found relief with ice bag application. Patient's lungs are clear, breathing unlabored, and equal chest expansion. Pt did have incision exposed to air at the beginning of the shift. Incision was pink, intact, with no drainage. Patient needs reinforcement about paging nurse for pain. Patient would rather wait until I come around for frequent checks and then verbalize her pain. Patient has no complaints other than numbness. No distress noted. Call bell within reach, personal items at bedside, bed in lowest position, and side rails up x2. WCTM.

## 2019-04-24 NOTE — SUBJECTIVE & OBJECTIVE
"Principal Problem:Abscess of left hand    Principal Orthopedic Problem: same    Interval History: Patient seen and examined at bedside.   No acute events overnight.  Afebrile. Patient states that ROM of thumb has become much better since yesterday. Pain has also improved. Clinically, the hand/thumb appear to be improving. Remains to have sensory branches of the median nerve deficits.       Review of patient's allergies indicates:  No Known Allergies    Current Facility-Administered Medications   Medication    acetaminophen tablet 1,000 mg    albuterol-ipratropium 2.5 mg-0.5 mg/3 mL nebulizer solution 3 mL    cefTRIAXone (ROCEPHIN) 2 g in dextrose 5 % 50 mL IVPB    celecoxib capsule 200 mg    dextrose 50% injection 12.5 g    dextrose 50% injection 25 g    fluticasone 50 mcg/actuation nasal spray 100 mcg    glucagon (human recombinant) injection 1 mg    glucose chewable tablet 16 g    glucose chewable tablet 24 g    morphine injection 2 mg    morphine injection 2 mg    ondansetron injection 4 mg    oxyCODONE immediate release tablet 5 mg    oxyCODONE immediate release tablet Tab 10 mg    polyethylene glycol packet 17 g    pregabalin capsule 150 mg    promethazine (PHENERGAN) 6.25 mg in dextrose 5 % 50 mL IVPB    ramelteon tablet 8 mg    senna-docusate 8.6-50 mg per tablet 1 tablet    sodium chloride 0.9% flush 10 mL    Tdap vaccine injection 0.5 mL     Objective:     Vital Signs (Most Recent):  Temp: 96.3 °F (35.7 °C) (04/24/19 0408)  Pulse: 68 (04/24/19 0441)  Resp: 16 (04/24/19 0408)  BP: (!) 120/57 (04/24/19 0441)  SpO2: 96 % (04/24/19 0441) Vital Signs (24h Range):  Temp:  [96.2 °F (35.7 °C)-97.5 °F (36.4 °C)] 96.3 °F (35.7 °C)  Pulse:  [68-77] 68  Resp:  [16-18] 16  SpO2:  [94 %-98 %] 96 %  BP: (100-126)/(51-65) 120/57     Weight: 102.1 kg (225 lb)  Height: 5' 4" (162.6 cm)  Body mass index is 38.62 kg/m².      Ortho/SPM Exam    Physical Exam:  NAD, A/O x 3.  incisions with nylon sutures in " place healing well with no s/s infection- no palpable fluid collection or TTP  Decrease in sensation left thumb on ulnar and radial sides and extends volar to the thenar eminence   BCR all digits    Significant Labs: All pertinent labs within the past 24 hours have been reviewed.    Significant Imaging: I have reviewed and interpreted all pertinent imaging results/findings.

## 2019-04-24 NOTE — PLAN OF CARE
SW met with the Pt at the bedside regarding home health and infusion company choices.  Ms Rodriguez reported she has called her daughter who is checking on the home health choice.  Pt reports her discharge is moved to 4-26-19.

## 2019-04-24 NOTE — TELEPHONE ENCOUNTER
----- Message from Yared Ridley MD sent at 4/24/2019  9:41 AM CDT -----  Please schedule 2 week follow up. Surgery with Dr Martínez on 4/21 for I&D and carpal tunnel release after cat bite.

## 2019-04-24 NOTE — PLAN OF CARE
[facility]    HOME HEALTH ORDERS  FACE TO FACE ENCOUNTER    Patient Name: Geneva Rodriguez  YOB: 1965    PCP: Talia Chambers MD   PCP Address: [address] / [place] [address]  PCP Phone Number: [phone]  PCP Fax: [phone]    Encounter Date: 04/24/2019    Admit to Home Health    Diagnoses:  Active Hospital Problems    Diagnosis  POA    *Abscess of left hand [L02.512]  Yes     Priority: 1 - High    Suppurative tenosynovitis of flexor tendon of left hand [M65.142]  Yes     Priority: 2     Cellulitis of left upper extremity [L03.114]  Yes     Priority: 3     Cat bite of left thumb [S61.052A, W55.01XA]  Yes     Priority: 4       Resolved Hospital Problems   No resolved problems to display.       Future Appointments   Date Time Provider Department Center   4/25/2019  2:45 PM Talia Chambers MD [facility]   5/6/2019 10:00 AM Carolina Kitchen PA-C [facility]     Follow-up Information     Talia Chambers MD On 4/25/2019.    Specialty:  Internal Medicine  Why:  Hospital Follow up: 2:45pm  Contact information:  [address]  [place] [address]  [phone]                     I have seen and examined this patient face to face today. My clinical findings that support the need for the home health skilled services and home bound status are the following:  Medical restrictions requiring assistance of another human to leave home due to  IV infusion Needs.    Allergies:Review of patient's allergies indicates:  No Known Allergies    Diet: regular diet    Activities: activity as tolerated and NON WEIGHT BEARING TO LEFT UPPER EXTREMITY    Nursing:   SN to complete comprehensive assessment including routine vital signs. Instruct on disease process and s/s of complications to report to MD. Review/verify medication list sent home with the patient at time of discharge  and instruct patient/caregiver as needed. Frequency may be adjusted depending on  start of care date.    Notify MD if SBP > 160 or < 90; DBP > 90 or < 50; HR > 120 or < 50; Temp > 101; Other:         CONSULTS:  n/a    MISCELLANEOUS CARE:  Home Infusion Therapy:   SN to perform Infusion Therapy/Central Line Care.  Review Central Line Care & Central Line Flush with patient.    Administer (drug and dose): CEFTRIAXONE 2,000 MG IV EVERY 24 HOURS      Last dose given: 4/24/19 @ 06:00                       Home dose due: 04/25/19 @ 06:00    END DATE - 05/05/19 AFTER AM DOSE    LABORATORY   - PLEASE DRAW - CRP, ESR, CBC and CMP weekly    FAX to [facility] @ [phone]      Scrub the Hub: Prior to accessing the line, always perform a 30 second alcohol scrub  Each lumen of the central line is to be flushed at least daily with 10 mL Normal Saline and 3 mL Heparin flush (10 units/mL)  Skilled Nurse (SN) may draw blood from IV access  Blood Draw Procedure:   - Aspirate at least 5 mL of blood   - Discard   - Obtain specimen   - Change injection cap   - Flush with 20 mL Normal Saline followed by a                 3-5 mL Heparin flush (10 units/mL)  Central :   - Sterile dressing changes are done weekly and as needed.   - Use chlor-hexadine scrub to cleanse site, apply Biopatch to insertion site,       apply securement device dressing   - Injection caps are changed weekly and after EVERY lab draw.   - If sterile gauze is under dressing to control oozing,                 dressing change must be performed every 24 hours until gauze is not needed.    WOUND CARE ORDERS  yes:  Surgical Wound:  Location: LEFT HAND     Consult ET nurse        Apply the following to wound:   Other: apply ace wrap bandage to hand daily  (frequency)      Medications: Review discharge medications with patient and family and provide education.      Current Discharge Medication List      START taking these medications    Details   acetaminophen (TYLENOL) 325 MG tablet Take 2 tablets (650 mg total) by  mouth 4 (four) times daily as needed for Pain.      cefTRIAXone 2 g in dextrose 5 % 50 mL (ROCEPHIN) 2 g/50 mL PgBk IVPB Inject 50 mLs (2 g total) into the vein Daily. END DATE 5/5/19 for 12 days      celecoxib (CELEBREX) 200 MG capsule Take 1 capsule (200 mg total) by mouth once daily. for 22 days  Qty: 22 capsule, Refills: 0      oxyCODONE (ROXICODONE) 5 MG immediate release tablet Take 1 tablet (5 mg total) by mouth every 4 (four) hours as needed (LEFT HAND PAIN).  Qty: 60 tablet, Refills: 0      polyethylene glycol (GLYCOLAX) 17 gram/dose powder Mix 1 capful (17 g) with liquid and take by mouth once daily.  Qty: 510 g, Refills: 1      pregabalin (LYRICA) 150 MG capsule Take 1 capsule (150 mg total) by mouth every evening.  Qty: 30 capsule, Refills: 1      senna (SENNA) 8.6 mg tablet Take 1 tablet by mouth 2 (two) times daily. WHILE TAKING OPIATE PAIN MEDICATION         CONTINUE these medications which have NOT CHANGED    Details   albuterol (PROVENTIL HFA) 90 mcg/actuation inhaler Inhale 2 puffs into the lungs every 6 (six) hours as needed for Wheezing.             I certify that this patient is confined to her home and needs intermittent skilled nursing care.

## 2019-04-24 NOTE — PROGRESS NOTES
[facility]  Orthopedics  Progress Note    Patient Name: Geneva Rodriguez  MRN: [id number]  Admission Date: 4/21/2019  Hospital Length of Stay: 3 days  Attending Provider: Ted Ovalle MD  Primary Care Provider: Talia Chambers MD  Follow-up For: Procedure(s) (LRB):  Incision and Drainage Hand (Left)  RELEASE, CARPAL TUNNEL (Left)  RELEASE, TRIGGER FINGER, THUMB    Post-Operative Day: 3 Days Post-Op  Subjective:     Principal Problem:Abscess of left hand    Principal Orthopedic Problem: same    Interval History: Patient seen and examined at bedside.   No acute events overnight.  Afebrile. Patient states that ROM of thumb has become much better since yesterday. Pain has also improved. Clinically, the hand/thumb appear to be improving. Remains to have sensory branches of the median nerve deficits.       Review of patient's allergies indicates:  No Known Allergies    Current Facility-Administered Medications   Medication    acetaminophen tablet 1,000 mg    albuterol-ipratropium 2.5 mg-0.5 mg/3 mL nebulizer solution 3 mL    cefTRIAXone (ROCEPHIN) 2 g in dextrose 5 % 50 mL IVPB    celecoxib capsule 200 mg    dextrose 50% injection 12.5 g    dextrose 50% injection 25 g    fluticasone 50 mcg/actuation nasal spray 100 mcg    glucagon (human recombinant) injection 1 mg    glucose chewable tablet 16 g    glucose chewable tablet 24 g    morphine injection 2 mg    morphine injection 2 mg    ondansetron injection 4 mg    oxyCODONE immediate release tablet 5 mg    oxyCODONE immediate release tablet Tab 10 mg    polyethylene glycol packet 17 g    pregabalin capsule 150 mg    promethazine (PHENERGAN) 6.25 mg in dextrose 5 % 50 mL IVPB    ramelteon tablet 8 mg    senna-docusate 8.6-50 mg per tablet 1 tablet    sodium chloride 0.9% flush 10 mL    Tdap vaccine injection 0.5 mL     Objective:     Vital Signs (Most Recent):  Temp: 96.3 °F (35.7 °C) (04/24/19 0408)  Pulse: 68 (04/24/19  "0441)  Resp: 16 (04/24/19 0408)  BP: (!) 120/57 (04/24/19 0441)  SpO2: 96 % (04/24/19 0441) Vital Signs (24h Range):  Temp:  [96.2 °F (35.7 °C)-97.5 °F (36.4 °C)] 96.3 °F (35.7 °C)  Pulse:  [68-77] 68  Resp:  [16-18] 16  SpO2:  [94 %-98 %] 96 %  BP: (100-126)/(51-65) 120/57     Weight: 102.1 kg (225 lb)  Height: 5' 4" (162.6 cm)  Body mass index is 38.62 kg/m².      Ortho/SPM Exam    Physical Exam:  NAD, A/O x 3.  incisions with nylon sutures in place healing well with no s/s infection- no palpable fluid collection or TTP  Decrease in sensation left thumb on ulnar and radial sides and extends volar to the thenar eminence   BCR all digits    Significant Labs: All pertinent labs within the past 24 hours have been reviewed.    Significant Imaging: I have reviewed and interpreted all pertinent imaging results/findings.    Assessment/Plan:     * Abscess of left hand  Geneva Rodirguez is a 53 y.o. female with left hand abscess from cat bite s/p I&D 4/21/19  - IV unasyn- gram stain negative, cx pending- NGTD  - pain control per primary  - NWB LUE, keep LUE elevated at all times to minimize swelling  - WBC down to 10 this am  - pt WBC decreasing and overall pain improving, will continue to monitor  - no surgical intervention needed from orthopedics for remainder of admission  - to f/u in ortho hand clinic in 2 weeks    Cellulitis of left upper extremity                  Yared Ridley MD  Orthopedics  [facility]  "

## 2019-04-24 NOTE — ASSESSMENT & PLAN NOTE
Geneva Rodriguez is a 53 y.o. female with left hand abscess from cat bite s/p I&D 4/21/19  - IV unasyn- gram stain negative, cx pending- NGTD  - pain control per primary  - NWB LUE, keep LUE elevated at all times to minimize swelling  - WBC down to 10 this am  - pt WBC decreasing and overall pain improving, will continue to monitor  - no surgical intervention needed from orthopedics for remainder of admission  - to f/u in ortho hand clinic in 2 weeks

## 2019-04-24 NOTE — CONSULTS
Single lumen 18G x 10CM  midline placed right basilic vein. Max dwell date 5/23/19, Lot#[id number].  Needle advanced into the vessel under real time ultrasound guidance.  Image recorded and saved.

## 2019-04-24 NOTE — PLAN OF CARE
CM to the Bedside to see the patient at this time. She was notified that the CM team continues to follow throughout this hospital admission for D/C needs and/or recommendations. Current D/C plan is Home with Home Health and IV Abx. The patient is aware of the plan and was asked to think of Home Health choices to provide to the CM team. CM name and number remain on the board at the Bedside and the patient was instructed to call with D/C needs or questions. Understanding verbalized.     Follow-up With  Details  Why  Contact Info   Samantha Estevez MD  On 4/29/2019  Establishment of Care and Hospital Follow Up: 2:00pm  [address] [facility] [address]  [phone]       Jana Gage MD  On 5/9/2019  Infectious Disease Follow Up: 9:30am  [address]  [facility] [address]  [phone]

## 2019-04-25 VITALS
RESPIRATION RATE: 18 BRPM | TEMPERATURE: 97 F | WEIGHT: 225 LBS | DIASTOLIC BLOOD PRESSURE: 56 MMHG | OXYGEN SATURATION: 91 % | HEART RATE: 84 BPM | SYSTOLIC BLOOD PRESSURE: 111 MMHG | HEIGHT: 64 IN | BODY MASS INDEX: 38.41 KG/M2

## 2019-04-25 LAB
ALBUMIN SERPL BCP-MCNC: 3.3 G/DL (ref 3.5–5.2)
ALP SERPL-CCNC: 65 U/L (ref 55–135)
ALT SERPL W/O P-5'-P-CCNC: 23 U/L (ref 10–44)
ANION GAP SERPL CALC-SCNC: 8 MMOL/L (ref 8–16)
AST SERPL-CCNC: 20 U/L (ref 10–40)
BASOPHILS # BLD AUTO: 0.06 K/UL (ref 0–0.2)
BASOPHILS NFR BLD: 0.5 % (ref 0–1.9)
BILIRUB SERPL-MCNC: 0.3 MG/DL (ref 0.1–1)
BUN SERPL-MCNC: 16 MG/DL (ref 6–20)
CALCIUM SERPL-MCNC: 9.5 MG/DL (ref 8.7–10.5)
CHLORIDE SERPL-SCNC: 102 MMOL/L (ref 95–110)
CO2 SERPL-SCNC: 29 MMOL/L (ref 23–29)
CREAT SERPL-MCNC: 0.7 MG/DL (ref 0.5–1.4)
DIFFERENTIAL METHOD: ABNORMAL
EOSINOPHIL # BLD AUTO: 0.2 K/UL (ref 0–0.5)
EOSINOPHIL NFR BLD: 2 % (ref 0–8)
ERYTHROCYTE [DISTWIDTH] IN BLOOD BY AUTOMATED COUNT: 12.3 % (ref 11.5–14.5)
EST. GFR  (AFRICAN AMERICAN): >60 ML/MIN/1.73 M^2
EST. GFR  (NON AFRICAN AMERICAN): >60 ML/MIN/1.73 M^2
GLUCOSE SERPL-MCNC: 93 MG/DL (ref 70–110)
HCT VFR BLD AUTO: 38.8 % (ref 37–48.5)
HGB BLD-MCNC: 12.6 G/DL (ref 12–16)
IMM GRANULOCYTES # BLD AUTO: 0.03 K/UL (ref 0–0.04)
IMM GRANULOCYTES NFR BLD AUTO: 0.2 % (ref 0–0.5)
LYMPHOCYTES # BLD AUTO: 5.5 K/UL (ref 1–4.8)
LYMPHOCYTES NFR BLD: 45.8 % (ref 18–48)
MCH RBC QN AUTO: 30.6 PG (ref 27–31)
MCHC RBC AUTO-ENTMCNC: 32.5 G/DL (ref 32–36)
MCV RBC AUTO: 94 FL (ref 82–98)
MONOCYTES # BLD AUTO: 0.8 K/UL (ref 0.3–1)
MONOCYTES NFR BLD: 6.4 % (ref 4–15)
NEUTROPHILS # BLD AUTO: 5.4 K/UL (ref 1.8–7.7)
NEUTROPHILS NFR BLD: 45.1 % (ref 38–73)
NRBC BLD-RTO: 0 /100 WBC
PLATELET # BLD AUTO: 272 K/UL (ref 150–350)
PMV BLD AUTO: 9.7 FL (ref 9.2–12.9)
POTASSIUM SERPL-SCNC: 4.2 MMOL/L (ref 3.5–5.1)
PROT SERPL-MCNC: 6.6 G/DL (ref 6–8.4)
RBC # BLD AUTO: 4.12 M/UL (ref 4–5.4)
SODIUM SERPL-SCNC: 139 MMOL/L (ref 136–145)
WBC # BLD AUTO: 12.06 K/UL (ref 3.9–12.7)

## 2019-04-25 PROCEDURE — 63600175 PHARM REV CODE 636 W HCPCS: Performed by: HOSPITALIST

## 2019-04-25 PROCEDURE — 25000003 PHARM REV CODE 250: Performed by: HOSPITALIST

## 2019-04-25 PROCEDURE — 80053 COMPREHEN METABOLIC PANEL: CPT

## 2019-04-25 PROCEDURE — 99239 PR HOSPITAL DISCHARGE DAY,>30 MIN: ICD-10-PCS | Mod: ,,, | Performed by: HOSPITALIST

## 2019-04-25 PROCEDURE — 99239 HOSP IP/OBS DSCHRG MGMT >30: CPT | Mod: ,,, | Performed by: HOSPITALIST

## 2019-04-25 PROCEDURE — 36415 COLL VENOUS BLD VENIPUNCTURE: CPT

## 2019-04-25 PROCEDURE — 63600175 PHARM REV CODE 636 W HCPCS: Performed by: STUDENT IN AN ORGANIZED HEALTH CARE EDUCATION/TRAINING PROGRAM

## 2019-04-25 PROCEDURE — 25000003 PHARM REV CODE 250: Performed by: STUDENT IN AN ORGANIZED HEALTH CARE EDUCATION/TRAINING PROGRAM

## 2019-04-25 PROCEDURE — 25000003 PHARM REV CODE 250: Performed by: PHYSICIAN ASSISTANT

## 2019-04-25 PROCEDURE — 85025 COMPLETE CBC W/AUTO DIFF WBC: CPT

## 2019-04-25 RX ORDER — LACTULOSE 10 G/15ML
20 SOLUTION ORAL ONCE
Status: COMPLETED | OUTPATIENT
Start: 2019-04-25 | End: 2019-04-25

## 2019-04-25 RX ADMIN — OXYCODONE HYDROCHLORIDE 10 MG: 10 TABLET ORAL at 04:04

## 2019-04-25 RX ADMIN — CEFTRIAXONE 2 G: 2 INJECTION, SOLUTION INTRAVENOUS at 04:04

## 2019-04-25 RX ADMIN — MORPHINE SULFATE 2 MG: 2 INJECTION, SOLUTION INTRAMUSCULAR; INTRAVENOUS at 12:04

## 2019-04-25 RX ADMIN — MORPHINE SULFATE 2 MG: 2 INJECTION, SOLUTION INTRAMUSCULAR; INTRAVENOUS at 08:04

## 2019-04-25 RX ADMIN — MORPHINE SULFATE 2 MG: 2 INJECTION, SOLUTION INTRAMUSCULAR; INTRAVENOUS at 03:04

## 2019-04-25 RX ADMIN — CELECOXIB 200 MG: 200 CAPSULE ORAL at 09:04

## 2019-04-25 RX ADMIN — OXYCODONE HYDROCHLORIDE 10 MG: 10 TABLET ORAL at 09:04

## 2019-04-25 RX ADMIN — SENNOSIDES AND DOCUSATE SODIUM 1 TABLET: 8.6; 5 TABLET ORAL at 09:04

## 2019-04-25 RX ADMIN — OXYCODONE HYDROCHLORIDE 10 MG: 10 TABLET ORAL at 12:04

## 2019-04-25 RX ADMIN — LACTULOSE 20 G: 20 SOLUTION ORAL at 05:04

## 2019-04-25 RX ADMIN — POLYETHYLENE GLYCOL 3350 17 G: 17 POWDER, FOR SOLUTION ORAL at 09:04

## 2019-04-25 NOTE — PLAN OF CARE
[facility] and told admissions the Pt will be discharged home today.  [facility] plans to see the Pt in the morning.

## 2019-04-25 NOTE — NURSING
Pt given discharge paperwork, medications delivered to bedside, education provided on medication, follow up, midline, home health, etc. Pt refused pneumonia and tdap vaccines. Education provided on need for tdap follow up. Pt discharged ambulatory without distress. Supplies given for wound care.

## 2019-04-25 NOTE — PROGRESS NOTES
[facility] Hospital Medicine                                                                     Progress Note     Team: [facility] A Ted Ovalle MD   Admit Date: 4/21/2019   Hospital Day: 3  YAKELIN: 4/25/2019   Code status: Full Code   Principal Problem: Abscess of left hand     SUMMARY:     Geneva Rodriguez is a 53 yom with no significant pmhx presenting post cat bite and cellulitis found to have left hand abscess sp I/D 4/21/19.       SUBJECTIVE:     Ongoing numbness in her thumb    COntacted by Orthopedic surgery team, no plans for repeat washout or operative intervention  CM/SW team working on arranging outpatient IV antibiotics - d/c plan of care orders placed.     Patient tearful on interview and upset over being NPO, being unsure of the plan, being told different things from different medical staff.  Also concerned about missing work and being able to care for self to complete required therapy.     ROS (Positive in Bold, otherwise negative)  Pain Scale: 2 /10   Constitutional: fever, chills, night sweats  CV: chest pain, edema, palpitations  Resp: SOB, cough, sputum production  GI: changes in appetite, NVDC, pain, melena, hematochezia, GERD, hematemesis  : Dysuria, hematuria, urinary urgency, frequency  MSK: arthralgia/myalgia, joint swelling  SKIN: rashes, pruritis, petechiae   Neuro/Psych: FND, anxiety, depression      OBJECTIVE:     Vitals:  Temp:  [96.3 °F (35.7 °C)-97.8 °F (36.6 °C)]   Pulse:  [68-82]   Resp:  [16-20]   BP: (100-131)/(53-74)   SpO2:  [94 %-99 %]      I & O (Last 24H):   No intake or output data in the 24 hours ending 04/24/19 2015      GEN:  female  in no acute distress. Nontoxic. Resting in bed. Cooperative.    CVS: RRR. Normal s1 s2 no murmur, click, rub or gallop  LUNG: CTAB. Normal respiratory effort. No wheezes,  rhonchi, or crackles.  ABD: Normoactive BS, soft, NT, ND, no masses or organomegaly.  EXT: Left arm splint in place, left hand fingers, erythema, wound dressing not taken down.   SKIN: color, texture, turgor normal. No rashes or lesions  NEURO: Alert, oriented x 4, Spont mvt of all extremities. Decreased sensation of distal left thumb noted.      All recent labs and imaging has been reviewed.     Recent Results (from the past 24 hour(s))   Comprehensive Metabolic Panel (CMP)    Collection Time: 04/24/19  4:16 AM   Result Value Ref Range    Sodium 141 136 - 145 mmol/L    Potassium 4.1 3.5 - 5.1 mmol/L    Chloride 104 95 - 110 mmol/L    CO2 30 (H) 23 - 29 mmol/L    Glucose 99 70 - 110 mg/dL    BUN, Bld 17 6 - 20 mg/dL    Creatinine 0.9 0.5 - 1.4 mg/dL    Calcium 9.4 8.7 - 10.5 mg/dL    Total Protein 6.8 6.0 - 8.4 g/dL    Albumin 3.4 (L) 3.5 - 5.2 g/dL    Total Bilirubin 0.2 0.1 - 1.0 mg/dL    Alkaline Phosphatase 62 55 - 135 U/L    AST 14 10 - 40 U/L    ALT 18 10 - 44 U/L    Anion Gap 7 (L) 8 - 16 mmol/L    eGFR if African American >60.0 >60 mL/min/1.73 m^2    eGFR if non African American >60.0 >60 mL/min/1.73 m^2   CBC with Automated Differential    Collection Time: 04/24/19  4:16 AM   Result Value Ref Range    WBC 10.81 3.90 - 12.70 K/uL    RBC 4.09 4.00 - 5.40 M/uL    Hemoglobin 12.4 12.0 - 16.0 g/dL    Hematocrit 39.3 37.0 - 48.5 %    MCV 96 82 - 98 fL    MCH 30.3 27.0 - 31.0 pg    MCHC 31.6 (L) 32.0 - 36.0 g/dL    RDW 12.6 11.5 - 14.5 %    Platelets 276 150 - 350 K/uL    MPV 9.9 9.2 - 12.9 fL    Immature Granulocytes 0.4 0.0 - 0.5 %    Gran # (ANC) 4.9 1.8 - 7.7 K/uL    Immature Grans (Abs) 0.04 0.00 - 0.04 K/uL    Lymph # 5.0 (H) 1.0 - 4.8 K/uL    Mono # 0.7 0.3 - 1.0 K/uL    Eos # 0.2 0.0 - 0.5 K/uL    Baso # 0.05 0.00 - 0.20 K/uL    nRBC 0 0 /100 WBC    Gran% 44.7 38.0 - 73.0 %    Lymph% 46.0 18.0 - 48.0 %    Mono% 6.7 4.0 - 15.0 %    Eosinophil% 1.7 0.0 - 8.0 %    Basophil% 0.5 0.0 - 1.9 %    Aniso Slight      Poik Slight     Poly Occasional     Hypo Occasional     Ovalocytes Occasional     Differential Method Automated        Active Hospital Problems    Diagnosis  POA    *Abscess of left hand [L02.512]  Yes     Priority: 1 - High    Suppurative tenosynovitis of flexor tendon of left hand [M65.142]  Yes     Priority: 2     Cellulitis of left upper extremity [L03.114]  Yes     Priority: 3     Cat bite of left thumb [S61.052A, W55.01XA]  Yes     Priority: 4       Resolved Hospital Problems   No resolved problems to display.          ASSESSMENT AND PLAN:       Abscess of the left hand with cellulitis secondary to cat bite  - As per MRI  -ID recs IV Ceftriaxone and continue on discharge  -Midline placed today, Orthopedic surgery w/o plans for operation will need 2 week follow up  Have requested they provided patient with further wound care instruction     -Home health setup, but home infusion company not in network with patient insurance.        - S/p I&D now with splint 4/21/19 -- NWB LUE and keep elevated at all times to minimize swelling  - Wound cultures NGTD  - Leukocytosis  resolved  - Analgesics PRN ordered  - ID consulted   - Ortho following  - Remains HDS and afebrile     Pain control - NSAID, Lyrica, Opiates PRN    Prophylaxis- SCDs    Code Status- Full Code     Discharge plan and follow up - home with iv antibiotics and outpatient orthopedics and ID follow ups.       Ted Ovalle M.D.  Attending Physician  [facility] Medicine Dept.  Pager: [phone]  Spectralink -x [phone]

## 2019-04-25 NOTE — PROGRESS NOTES
[facility]  Orthopedics  Progress Note    Patient Name: Geneva Rodriguez  MRN: [id number]  Admission Date: 4/21/2019  Hospital Length of Stay: 4 days  Attending Provider: Ted Ovalle MD  Primary Care Provider: Samantha Estevez MD  Follow-up For: Procedure(s) (LRB):  Incision and Drainage Hand (Left)  RELEASE, CARPAL TUNNEL (Left)  RELEASE, TRIGGER FINGER, THUMB    Post-Operative Day: 4 Days Post-Op  Subjective:     Principal Problem:Abscess of left hand    Principal Orthopedic Problem: same    Interval History: Patient seen and examined at bedside.   No acute events overnight.  Afebrile. ROM is improving. Pain level is decreasing. Remains to have sensory branches of the median nerve deficits. 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       Review of patient's allergies indicates:  No Known Allergies    Current Facility-Administered Medications   Medication    albuterol-ipratropium 2.5 mg-0.5 mg/3 mL nebulizer solution 3 mL    cefTRIAXone (ROCEPHIN) 2 g in dextrose 5 % 50 mL IVPB    celecoxib capsule 200 mg    dextrose 50% injection 12.5 g    dextrose 50% injection 25 g    fluticasone 50 mcg/actuation nasal spray 100 mcg    glucagon (human recombinant) injection 1 mg    glucose chewable tablet 16 g    glucose chewable tablet 24 g    morphine injection 2 mg    morphine injection 2 mg    ondansetron injection 4 mg    oxyCODONE immediate release tablet 5 mg    oxyCODONE immediate release tablet Tab 10 mg    polyethylene glycol packet 17 g    pregabalin capsule 150 mg    promethazine (PHENERGAN) 6.25 mg in dextrose 5 % 50 mL IVPB    ramelteon tablet 8 mg    senna-docusate 8.6-50 mg per tablet 1 tablet    sodium chloride 0.9% flush 10 mL    Tdap vaccine injection 0.5 mL     Objective:     Vital Signs (Most Recent):  Temp: 96.8 °F (36 °C) (04/25/19 0700)  Pulse: 88  "(04/25/19 0700)  Resp: 18 (04/25/19 0700)  BP: 124/61 (04/25/19 0700)  SpO2: 95 % (04/25/19 0700) Vital Signs (24h Range):  Temp:  [96 °F (35.6 °C)-97.8 °F (36.6 °C)] 96.8 °F (36 °C)  Pulse:  [69-88] 88  Resp:  [18-20] 18  SpO2:  [93 %-99 %] 95 %  BP: (114-131)/(56-74) 124/61     Weight: 102.1 kg (225 lb)  Height: 5' 4" (162.6 cm)  Body mass index is 38.62 kg/m².      Ortho/SPM Exam    Physical Exam:  NAD, A/O x 3.  incisions with nylon sutures in place healing well with no s/s infection- no palpable fluid collection or TTP  Decrease in sensation left thumb on ulnar and radial sides and extends volar to the thenar eminence   BCR all digits    Significant Labs: All pertinent labs within the past 24 hours have been reviewed.    Significant Imaging: I have reviewed and interpreted all pertinent imaging results/findings.    Assessment/Plan:     * Abscess of left hand  Geneva Rodriguez is a 53 y.o. female with left hand abscess from cat bite s/p I&D 4/21/19  - Abx: Recephin  - pain control per primary  - NWB LUE, keep LUE elevated at all times to minimize swelling  - WBC has normalized  - overall pain and ROM improving, will continue to monitor  - patient instructed to work on ROM of the fingers  - no surgical intervention needed from orthopedics for remainder of admission  - to f/u in ortho hand clinic in 2 weeks    Cellulitis of left upper extremity                  Yared Ridley MD  Orthopedics  [facility]  "

## 2019-04-25 NOTE — PLAN OF CARE
SW explained to the Pt [employer] is still waiting insurance authorization.  Pt voiced understanding.

## 2019-04-25 NOTE — PLAN OF CARE
Problem: Adult Inpatient Plan of Care  Goal: Plan of Care Review  Outcome: Ongoing (interventions implemented as appropriate)  Plan of care reviewed with pt. Pt voiced understanding. Pt AAOx4. Pt denies any c/o during the shift. No apparent distress noted. Pain management maintained.Fall precautions maintained. Bed in lowest position, and locked. Call light within reach and advised to call for assistance. Side rails x 2 and slip resistant socks on at this time. Will continue to monitor

## 2019-04-25 NOTE — PLAN OF CARE
MERLIN called Destin at [employer] and added [employer] to the chart.  [facility] has been notified that the Pt is still in the hospital.  MERLIN met with the Pt at the bedside and explained [employer] has to check her insurance and then will come see her in the room.

## 2019-04-25 NOTE — SUBJECTIVE & OBJECTIVE
"Principal Problem:Abscess of left hand    Principal Orthopedic Problem: same    Interval History: Patient seen and examined at bedside.   No acute events overnight.  Afebrile. ROM is improving. Pain level is decreasing. Remains to have sensory branches of the median nerve deficits. 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       Review of patient's allergies indicates:  No Known Allergies    Current Facility-Administered Medications   Medication    albuterol-ipratropium 2.5 mg-0.5 mg/3 mL nebulizer solution 3 mL    cefTRIAXone (ROCEPHIN) 2 g in dextrose 5 % 50 mL IVPB    celecoxib capsule 200 mg    dextrose 50% injection 12.5 g    dextrose 50% injection 25 g    fluticasone 50 mcg/actuation nasal spray 100 mcg    glucagon (human recombinant) injection 1 mg    glucose chewable tablet 16 g    glucose chewable tablet 24 g    morphine injection 2 mg    morphine injection 2 mg    ondansetron injection 4 mg    oxyCODONE immediate release tablet 5 mg    oxyCODONE immediate release tablet Tab 10 mg    polyethylene glycol packet 17 g    pregabalin capsule 150 mg    promethazine (PHENERGAN) 6.25 mg in dextrose 5 % 50 mL IVPB    ramelteon tablet 8 mg    senna-docusate 8.6-50 mg per tablet 1 tablet    sodium chloride 0.9% flush 10 mL    Tdap vaccine injection 0.5 mL     Objective:     Vital Signs (Most Recent):  Temp: 96.8 °F (36 °C) (04/25/19 0700)  Pulse: 88 (04/25/19 0700)  Resp: 18 (04/25/19 0700)  BP: 124/61 (04/25/19 0700)  SpO2: 95 % (04/25/19 0700) Vital Signs (24h Range):  Temp:  [96 °F (35.6 °C)-97.8 °F (36.6 °C)] 96.8 °F (36 °C)  Pulse:  [69-88] 88  Resp:  [18-20] 18  SpO2:  [93 %-99 %] 95 %  BP: (114-131)/(56-74) 124/61     Weight: 102.1 kg (225 lb)  Height: 5' 4" (162.6 cm)  Body mass index is 38.62 kg/m².      Ortho/SPM Exam    Physical Exam:  NAD, A/O x 3.  incisions with nylon " sutures in place healing well with no s/s infection- no palpable fluid collection or TTP  Decrease in sensation left thumb on ulnar and radial sides and extends volar to the thenar eminence   BCR all digits    Significant Labs: All pertinent labs within the past 24 hours have been reviewed.    Significant Imaging: I have reviewed and interpreted all pertinent imaging results/findings.

## 2019-04-25 NOTE — ASSESSMENT & PLAN NOTE
Geneva Rodriguez is a 53 y.o. female with left hand abscess from cat bite s/p I&D 4/21/19  - Abx: Recephin  - pain control per primary  - NWB LUE, keep LUE elevated at all times to minimize swelling  - WBC has normalized  - overall pain and ROM improving, will continue to monitor  - patient instructed to work on ROM of the fingers  - no surgical intervention needed from orthopedics for remainder of admission  - to f/u in ortho hand clinic in 2 weeks

## 2019-04-25 NOTE — PLAN OF CARE
SW spoke with Destin with [employer] who said the Pt will be ready to go home in about 30 minutes.

## 2019-04-26 NOTE — PLAN OF CARE
Patient discharged home on 4/25/19. IV antibiotics to be delivered to patient's home from [facility]. [facility] to provide HH services.     04/26/19 0804   Final Note   Assessment Type Final Discharge Note   Anticipated Discharge Disposition Home   What phone number can be called within the next 1-3 days to see how you are doing after discharge?   ([phone])   Hospital Follow Up  Appt(s) scheduled? Yes   Discharge plans and expectations educations in teach back method with documentation complete? Yes   Right Care Referral Info   Post Acute Recommendation Home-care   Referral Type Home Health   Facility Name [facility]    [address] [facility] [place] [address]

## 2019-04-26 NOTE — DISCHARGE SUMMARY
[facility] Hospital Medicine  Discharge Summary      Patient Name: Geneva Rodriguez  MRN: [id number]  Admission Date: 2019  Hospital Length of Stay: 4 days  Discharge Date and Time: 2019  7:11 PM  Attending Physician: Ted Ovalle MD  Discharging Provider: Ted Ovalle MD  Primary Care Provider: Samantha Estevez MD    [facility] Medicine Team: [facility] HOSP MED A Ted Ovalle MD    HPI:   Chief Complaint:        Chief Complaint   Patient presents with    Animal Bite       reports being bit by cat tonight, bite pradip to left thumb, last tetanus unknown          HPI: Patient is a 53 year old lady with no significant medical history.  She presents with left thumb pain following a cat bite on Friday.  Patient states that she was attempting to give medication to a  kitten when it bit her left thumb.  Since that time, she has noted increased pain and swelling to left thumb and fingers.  Pain radiates up her forearm.  It is worse with movement.  She took Aleve, which provided mild improvement.  She states she is otherwise in her usual state of health and denies chest pain, SOB, dizziness, palpitations, fever/chills, N/V/D, numbness, tingling.  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  She does not smoke.         Procedure(s) (LRB):  Incision and Drainage Hand (Left)  RELEASE, CARPAL TUNNEL (Left)  RELEASE, TRIGGER FINGER, THUMB      Hospital Course:     Patient admitted for concerns of cellulitis and abscess of the left hand after a cat bite.  She was seen by orthopedic surgery team and taken for operative intervention with incision and drainages, purulent fluid sent for culture but returned culture negative.  She was started on Empiric IV antibiotics with Unasyn and ID was consulted, Unasyn was continued for most of hospital stay, but as cultures appeared negative, final infectious disease recs were to switch to IV ceftriaxone and complete a total 14 day IV  antibiotic course from the date of her operation.     Orthopedic surgery followed patient post operatively, she was managed for pain control with multimodal therapy - APAP, NSAID-Celebrex, Lyrica and PRN opiate medications, she is recommended to be non-weight bearing to the left hand, with elevation as much as patient can tolerate, she has a ace wrap over hand and wrist.  Orthopedic surgery will follow patient in clinic in 2 weeks on discharge.  Her numbness related to his injury and operation was monitored and felt to be related to acute issues and expected to resolve, orthopedic surgery to follow as outpatient.     Her home health and IV antibiotics were arranged on 4/26, some delays in finding an infusion company in [place] that was accepting of pts insurance.     Of note patient was worried and tearful at times about her ability to resume work and continue home care and IV infusion of antibiotics.  She did attribute any tearfulness to depression and did ask if she could be started on medication.   Patient has an upcoming appointment to establish Primary Care, briefly discussed starting patient on an SSRI but given timeframe for possible improvement in symptoms recommended may be more prudent to discuss with her new primary physician and allow them to manage and new medications.     GEN:  female  in no acute distress. Nontoxic. Resting in bed. Cooperative.     CVS: RRR. Normal s1 s2 no murmur, click, rub or gallop  LUNG: CTAB. Normal respiratory effort. No wheezes, rhonchi, or crackles.  ABD: Normoactive BS, soft, NT, ND, no masses or organomegaly.  EXT: Left arm splint in place, left hand fingers, erythema, wound dressing not taken down.   SKIN: color, texture, turgor normal. No rashes or lesions  NEURO: Alert, oriented x 4, Spont mvt of all extremities. Decreased sensation of distal left thumb noted.         Consults:   Consults (From admission, onward)        Status Ordering Provider     Inpatient  consult to Infectious Diseases  Once     Provider:  (Not yet assigned)    Completed RADHA GALICIA     Inpatient consult to Midline team  Once     Provider:  (Not yet assigned)    Completed RIC VERNON     Inpatient consult to Orthopedic Surgery  Once     Provider:  (Not yet assigned)    Completed PABLO KOROMA          Final Active Diagnoses:    Diagnosis Date Noted POA    PRINCIPAL PROBLEM:  Abscess of left hand [L02.512]  Yes    Suppurative tenosynovitis of flexor tendon of left hand [M65.142] 04/23/2019 Yes    Cellulitis of left upper extremity [L03.114] 04/21/2019 Yes    Cat bite of left thumb [S61.052A, W55.01XA] 04/21/2019 Yes      Problems Resolved During this Admission:      Discharged Condition: stable    Disposition: Home-Health Care [facility]    Follow Up:  Follow-up Information     Samantha Estevez MD On 4/29/2019.    Specialty:  Internal Medicine  Why:  Establishment of Care and Hospital Follow Up: 2:00pm  Contact information:  [address]  [facility] [address]  [phone]             Jana Gage MD On 5/9/2019.    Specialty:  Infectious Diseases  Why:  Infectious Disease Follow Up: 9:30am  Contact information:  [address]  [facility] [address]  [phone]                 Patient Instructions:      Diet Adult Regular     Notify your health care provider if you experience any of the following:  temperature >100.4     Notify your health care provider if you experience any of the following:  persistent nausea and vomiting or diarrhea     Notify your health care provider if you experience any of the following:  severe uncontrolled pain     Notify your health care provider if you experience any of the following:  redness, tenderness, or signs of infection (pain, swelling, redness, odor or green/yellow discharge around incision site)     Notify your health care provider if you experience any of the following:  difficulty breathing or increased cough     Notify your  health care provider if you experience any of the following:  severe persistent headache     Notify your health care provider if you experience any of the following:  persistent dizziness, light-headedness, or visual disturbances     Notify your health care provider if you experience any of the following:  increased confusion or weakness     Activity as tolerated     Medications:  Reconciled Home Medications:      Medication List      START taking these medications    acetaminophen 325 MG tablet  Commonly known as:  TYLENOL  Take 2 tablets (650 mg total) by mouth 4 (four) times daily as needed for Pain.     cefTRIAXone 2 g in dextrose 5 % 50 mL 2 g/50 mL Pgbk IVPB  Commonly known as:  ROCEPHIN  Inject 50 mLs (2 g total) into the vein Daily. END DATE 5/5/19 for 12 days     celecoxib 200 MG capsule  Commonly known as:  CeleBREX  Take 1 capsule (200 mg total) by mouth once daily. for 22 days     LYRICA 150 MG capsule  Generic drug:  pregabalin  Take 1 capsule (150 mg total) by mouth every evening.     oxyCODONE 5 MG immediate release tablet  Commonly known as:  ROXICODONE  Take 1 tablet (5 mg total) by mouth every 4 (four) hours as needed (LEFT HAND PAIN).     polyethylene glycol 17 gram/dose powder  Commonly known as:  GLYCOLAX  Mix 1 capful (17 g) with liquid and take by mouth once daily.     senna 8.6 mg tablet  Commonly known as:  SENNA  Take 1 tablet by mouth 2 (two) times daily. WHILE TAKING OPIATE PAIN MEDICATION        CONTINUE taking these medications    PROVENTIL HFA 90 mcg/actuation inhaler  Generic drug:  albuterol  Inhale 2 puffs into the lungs every 6 (six) hours as needed for Wheezing.            Significant Diagnostic Studies: Labs:   CMP   Recent Labs   Lab 04/24/19  0416 04/25/19  0456    139   K 4.1 4.2    102   CO2 30* 29   GLU 99 93   BUN 17 16   CREATININE 0.9 0.7   CALCIUM 9.4 9.5   PROT 6.8 6.6   ALBUMIN 3.4* 3.3*   BILITOT 0.2 0.3   ALKPHOS 62 65   AST 14 20   ALT 18 23   ANIONGAP  7* 8   ESTGFRAFRICA >60.0 >60.0   EGFRNONAA >60.0 >60.0    and CBC   Recent Labs   Lab 04/24/19  0416 04/25/19  0456   WBC 10.81 12.06   HGB 12.4 12.6   HCT 39.3 38.8    272     Microbiology: Wound Culture: negative  Procedure Component Value Units Date/Time   Culture, Anaerobic [[id number]] Collected: 04/21/19 0903   Order Status: Completed Specimen: Abscess from Hand, Left Updated: 04/25/19 1014    Anaerobic Culture Culture in progress   Aerobic culture [[id number]] Collected: 04/21/19 0903   Order Status: Completed Specimen: Abscess from Hand, Left Updated: 04/24/19 1404    Aerobic Bacterial Culture No growth   AFB Culture & Smear [[id number]] Collected: 04/21/19 0903   Order Status: Completed Specimen: Abscess from Hand, Left Updated: 04/22/19 2127    AFB Culture & Smear Culture in progress    AFB CULTURE STAIN No acid fast bacilli seen.   Fungus culture [[id number]] Collected: 04/21/19 0903   Order Status: Completed Specimen: Abscess from Hand, Left Updated: 04/25/19 0938    Fungus (Mycology) Culture Culture in progress   Gram stain [[id number]] Collected: 04/21/19 0903   Order Status: Completed Specimen: Abscess from Hand, Left Updated: 04/21/19 1154    Gram Stain Result Moderate WBC's     No organisms seen     MRI HAND FINGERS W WO CONTRAST LEFT    CLINICAL HISTORY:  r/o flexor teno thumb;    TECHNIQUE:  Multiplanar multisequence MRI examination LEFT hand performed.  Technologist notes the examination is limited due to patient unable to late hand flat due to swelling/soreness.  Postcontrast images after the administration of 10 cc Gadavist.    Images now available for radiology attending interpretation; surgery performed in the interim.    COMPARISON:  None    FINDINGS:  There is tenosynovitis at the level of the flexor tendon sheath.  Postcontrast images demonstrate enhancement at the level of the synovial sheath.  Inflammatory/infectious tenosynovitis must be a consideration.  Extensor tendons are  unremarkable as visualized.  There is no focal abscess demonstrated.  Fluid/edema is also identified extending volar to the adductor pollicis brevis.  Increased signal intensity at level of flexor pollicis longus.  The osseous structures demonstrate no evident abnormality.      Impression       Tenosynovitis of flexor tendons, likely in failure at axis or inflammatory with edema extending surrounding the adductor pollicis.  No focal abscess demonstrated.  Subcutaneous edema extending along the RIGHT thumb to the level of the distal phalanx.  Increased signal intensity at the level of the flexor pollicis longus may reflect tendinosis or inflammatory change.      Electronically signed by: Carlos Chapa MD  Date: 04/21/2019  Time: 13:23         Pending Diagnostic Studies:     None        Indwelling Lines/Drains at time of discharge:   Lines/Drains/Airways          None          Time spent on the discharge of patient: 45 minutes  Patient was seen and examined on the date of discharge and determined to be suitable for discharge.         Ted Ovalle MD  Department of Hospital Medicine  [facility]

## 2019-04-29 LAB — BACTERIA SPEC ANAEROBE CULT: NORMAL

## 2019-04-30 ENCOUNTER — PATIENT MESSAGE (OUTPATIENT)
Dept: INTERNAL MEDICINE | Facility: CLINIC | Age: 54
End: 2019-04-30

## 2019-05-01 ENCOUNTER — OFFICE VISIT (OUTPATIENT)
Dept: INTERNAL MEDICINE | Facility: CLINIC | Age: 54
End: 2019-05-01
Payer: COMMERCIAL

## 2019-05-01 ENCOUNTER — TELEPHONE (OUTPATIENT)
Dept: INFECTIOUS DISEASES | Facility: CLINIC | Age: 54
End: 2019-05-01

## 2019-05-01 VITALS
HEIGHT: 64 IN | HEART RATE: 82 BPM | SYSTOLIC BLOOD PRESSURE: 118 MMHG | OXYGEN SATURATION: 97 % | BODY MASS INDEX: 39.9 KG/M2 | DIASTOLIC BLOOD PRESSURE: 82 MMHG | WEIGHT: 233.69 LBS

## 2019-05-01 DIAGNOSIS — F32.A DEPRESSION, UNSPECIFIED DEPRESSION TYPE: Primary | ICD-10-CM

## 2019-05-01 DIAGNOSIS — L02.512 ABSCESS OF LEFT HAND: ICD-10-CM

## 2019-05-01 DIAGNOSIS — M65.142 SUPPURATIVE TENOSYNOVITIS OF FLEXOR TENDON OF LEFT HAND: ICD-10-CM
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
## 2019-05-01 DIAGNOSIS — L03.114 CELLULITIS OF LEFT UPPER EXTREMITY: ICD-10-CM

## 2019-05-01 DIAGNOSIS — S61.052D CAT BITE OF LEFT THUMB, SUBSEQUENT ENCOUNTER: ICD-10-CM

## 2019-05-01 PROCEDURE — 99999 PR PBB SHADOW E&M-EST. PATIENT-LVL IV: CPT | Mod: PBBFAC,,, | Performed by: PHYSICIAN ASSISTANT

## 2019-05-01 PROCEDURE — 3008F BODY MASS INDEX DOCD: CPT | Mod: CPTII,S$GLB,, | Performed by: PHYSICIAN ASSISTANT

## 2019-05-01 PROCEDURE — 3008F PR BODY MASS INDEX (BMI) DOCUMENTED: ICD-10-PCS | Mod: CPTII,S$GLB,, | Performed by: PHYSICIAN ASSISTANT

## 2019-05-01 PROCEDURE — 99214 OFFICE O/P EST MOD 30 MIN: CPT | Mod: S$GLB,,, | Performed by: PHYSICIAN ASSISTANT

## 2019-05-01 PROCEDURE — 99214 PR OFFICE/OUTPT VISIT, EST, LEVL IV, 30-39 MIN: ICD-10-PCS | Mod: S$GLB,,, | Performed by: PHYSICIAN ASSISTANT

## 2019-05-01 PROCEDURE — 99999 PR PBB SHADOW E&M-EST. PATIENT-LVL IV: ICD-10-PCS | Mod: PBBFAC,,, | Performed by: PHYSICIAN ASSISTANT

## 2019-05-01 RX ORDER — ALBUTEROL SULFATE 90 UG/1
2 AEROSOL, METERED RESPIRATORY (INHALATION) EVERY 6 HOURS PRN
Qty: 18 G | Refills: 0 | Status: SHIPPED | OUTPATIENT
Start: 2019-05-01 | End: 2019-05-16 | Stop reason: SDUPTHER

## 2019-05-01 RX ORDER — SERTRALINE HYDROCHLORIDE 50 MG/1
TABLET, FILM COATED ORAL
Qty: 30 TABLET | Refills: 3 | Status: SHIPPED | OUTPATIENT
Start: 2019-05-01 | End: 2019-05-16 | Stop reason: SDUPTHER

## 2019-05-01 NOTE — PATIENT INSTRUCTIONS
Depression  Depression is one of the most common mental health problems today. It is not just a state of unhappiness or sadness. It is a true disease. The cause seems to be related to a decrease in chemicals that transmit signals in the brain. Having a family history of depression, alcoholism, or suicide increases the risk. Chronic illness, chronic pain, migraine headaches and high emotional stress also increase the risk.  Depression is something we tend to recognize in others, but may have a hard time seeing in ourselves. It can show in many physical and emotional ways:  · Loss of appetite  · Over-eating  · Not being able to sleep  · Sleeping too much  · Tiredness not related to physical exertion  · Restlessness or irritability  · Slowness of movement or speech  · Feeling depressed or withdrawn  · Loss of interest in things you once enjoyed  · Trouble concentrating, poor memory, trouble making decisions  · Thoughts of harming or killing oneself, or thoughts that life is not worth living  · Low self-esteem  The treatment for depression may include both medicine and psychotherapy. Antidepressants can reduce suffering and can improve the ability to function during the depressed period. Therapy can offer emotional support and help you understand emotional factors that may be causing the depression.  Home care  · On-going care and support helps people manage this disease.  Find a healthcare provider and therapist who meet your needs. Seek help when you feel like you may be getting ill.  · Be kind to yourself. Make it a point to do things that you enjoy (gardening, walking in nature, going to a movie, etc.). Reward yourself for small successes.  · Take care of your physical body. Eat a balanced diet (low in saturated fat and high in fruits and vegetables). Exercise at least 3 times a week for 30 minutes. Even mild-moderate exercise (like brisk walking) can make you feel better.  · Avoid alcohol, which can make  depression worse.  · Take medicine as prescribed.  · Tell each of your healthcare providers about all of the prescription drugs, over-the-counter medicines, vitamins, and supplements you take. Certain supplements interact with medicines and can result in dangerous side effects. Ask your pharmacist when you have questions about drug interactions.  · Talk with your family and trusted friends about your feelings and thoughts. Ask them to help you recognize behavior changes early so you can get help and, if needed, medicine can be adjusted.  Follow-up care  Follow up with your healthcare provider, or as advised.  Call 911  Call 911 if you:  · Have suicidal thoughts, a suicide plan, and the means to carry out the plan  · Have trouble breathing  · Are very confused  · Feel very drowsy or have trouble awakening  · Faint or lose consciousness  · Have new chest pain that becomes more severe, lasts longer, or spreads into your shoulder, arm, neck, jaw or back  When to seek medical advice  Call your healthcare provider right away if any of these occur:  · Feeling extreme depression, fear, anxiety, or anger toward yourself or others  · Feeling out of control  · Feeling that you may try to harm yourself or another  · Hearing voices that others do not hear  · Seeing things that others do not see  · Cant sleep or eat for 3 days in a row  · Friends or family express concern over your behavior and ask you to seek help  Date Last Reviewed: 9/29/2015  © 5641-7560 [employer] [address] [place] [address], [place] [address]. All rights reserved. This information is not intended as a substitute for professional medical care. Always follow your healthcare professional's instructions.

## 2019-05-01 NOTE — TELEPHONE ENCOUNTER
Labs from 4/29/19  Reviewed:5/1/19    WBC: 8.2  Hemoglobin:13  Platelets:267  BUN:13  Creatinine:0.63  NA:140  K:4.5  ALP:76  AST:30  ALT:47  CRP:  Sed Rate:  Vancomycin trough:    Jana Espana MD  Infectious Disease Fellow, PGY-5  Pager: [phone] or ext: [phone]  [facility]

## 2019-05-01 NOTE — PROGRESS NOTES
Subjective:       Patient ID: Geneva Rodriguez is a 53 y.o. female.        Chief Complaint: Cough    Geneva Rodriguez is an established patient of Samantha Estevez MD here today for urgent care visit.    She is really here today as she wants to be cleared to return to work.  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  She is concerned financially about being out of work.  Discussed that ortho and ID need to clear her to return given that her left wrist is still wrapped and she is supposed to have minimal activity with this and she is receiving IV antibiotics.  She has ortho and ID f/u next week.  Discussed that return to work will have to be coordinated with them.    She is tearful today.  She admits to depression for 10-15 years with no treatment.  Dysphoric mood, increased eating.  Sleep is fine.  No SI or HI.  Also with some anxiety.  Will to start an SSRI but wants to complete some of the medications she is currently on first.      She admits to some cough and wheezing for the past few days.  She needs a refill of her albuterol.  No shortness of breath, fever, chest pain/tightness.           Review of Systems   Constitutional: Positive for activity change. Negative for chills, diaphoresis, fatigue, fever and unexpected weight change.   HENT: Negative for congestion, hearing loss, rhinorrhea, sore throat and trouble swallowing.    Eyes: Negative for discharge and visual disturbance.   Respiratory: Positive for wheezing. Negative for cough, chest tightness and shortness of breath.    Cardiovascular: Negative for chest pain, palpitations and leg swelling.   Gastrointestinal: Positive for constipation and diarrhea. Negative for abdominal pain, blood in stool, nausea and vomiting.   Endocrine: Negative for polydipsia and polyuria.   Genitourinary: Negative for difficulty urinating, dysuria, frequency, hematuria, menstrual problem and urgency.   Musculoskeletal: Positive for arthralgias and joint swelling. Negative  for back pain and neck pain.   Skin: Negative for rash.   Neurological: Negative for dizziness, syncope, weakness and headaches.   Psychiatric/Behavioral: Positive for dysphoric mood. Negative for confusion and sleep disturbance. The patient is not nervous/anxious.        Objective:      Physical Exam   Constitutional: She appears well-developed and well-nourished.   HENT:   Head: Normocephalic.   Right Ear: External ear normal.   Left Ear: External ear normal.   Mouth/Throat: Oropharynx is clear and moist.   Eyes: Pupils are equal, round, and reactive to light.   Cardiovascular: Normal rate, regular rhythm and normal heart sounds. Exam reveals no gallop and no friction rub.   No murmur heard.  Pulmonary/Chest: Effort normal and breath sounds normal. No respiratory distress.   Abdominal: Soft. Normal appearance. There is no tenderness.   Musculoskeletal: She exhibits no edema.   Left wrist and hand are wrapped  Right upper arm with dressed applied over catheter   Neurological: She is alert.   Skin: Skin is warm and dry.   Psychiatric: She has a normal mood and affect.   Nursing note and vitals reviewed.      Assessment:       1. Depression, unspecified depression type    2. Cellulitis of left upper extremity    3. Cat bite of left thumb, subsequent encounter    4. Abscess of left hand    5. Suppurative tenosynovitis of flexor tendon of left hand        Plan:       Geneva was seen today for cough.    Diagnoses and all orders for this visit:    Depression, unspecified depression type: start zoloft, f/u in 4 weeks with PCP  -     sertraline (ZOLOFT) 50 MG tablet; Start 1/2 tablet daily x 1 week then increase to 1 tablet daily.    Cellulitis of left upper extremity  Cat bite of left thumb, subsequent encounter  Abscess of left hand  Suppurative tenosynovitis of flexor tendon of left hand       -      Receiving IV rocephin x 2 weeks total.  Has ID and ortho f/u.  Will defer to them regarding return to work.    Other  "orders  -     albuterol (PROVENTIL HFA) 90 mcg/actuation inhaler; Inhale 2 puffs into the lungs every 6 (six) hours as needed for Wheezing.    F/u 4 weeks with PCP, sooner if needed.  Risks/benefits of SSRI discussed.      Pt has been given instructions populated from [employer] database and has verbalized understanding of the after visit summary and information contained wherein.    Follow up with a primary care provider. May go to ER for acute shortness of breath, lightheadedness, fever, or any other emergent complaints or changes in condition.    "This note will be shared with the patient"    Future Appointments   Date Time Provider Department Center   5/6/2019 10:00 AM Carolina Kitchen PA-C [facility]   5/9/2019  9:30 AM Jana Gage MD [facility] ID [facility]   5/31/2019  2:30 PM Vinnie Bernal MD [facility] IM [facility] PC[facility]               "

## 2019-05-02 ENCOUNTER — TELEPHONE (OUTPATIENT)
Dept: ORTHOPEDICS | Facility: CLINIC | Age: 54
End: 2019-05-02

## 2019-05-02 NOTE — TELEPHONE ENCOUNTER
Let the patient know the following (per Carolina):    Can one of you let pt know I cannot clear her to return to work without ever having seen her. If she'd like to move up her appt she can but cant promise when she can go back until we evaluate her.

## 2019-05-02 NOTE — TELEPHONE ENCOUNTER
"----- Message from Carolina Kitchen PA-C sent at 5/2/2019  8:51 AM CDT -----  Can one of you let pt know I cannot clear her to return to work without ever having seen her. If she'd like to move up her appt she can but cant promise when she can go back until we evaluate her.   ----- Message -----  From: Melody Kerr PA-C  Sent: 5/1/2019   1:13 PM  To: RONA Allen,    I saw Ms. Rodriguez today for a f/u appointment.    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        She is scheduled to see you 5/6/19.      I will defer to you regarding return to work.  From hospital records it says "non weight bearing left hand and elevate as much as possible."      Melody Tillman PA-C  [facility]  [address] [place] [id number]  P: [phone]  F: [phone]          "

## 2019-05-03 ENCOUNTER — PATIENT MESSAGE (OUTPATIENT)
Dept: INFECTIOUS DISEASES | Facility: CLINIC | Age: 54
End: 2019-05-03

## 2019-05-03 ENCOUNTER — PATIENT MESSAGE (OUTPATIENT)
Dept: INTERNAL MEDICINE | Facility: CLINIC | Age: 54
End: 2019-05-03

## 2019-05-03 DIAGNOSIS — F32.A DEPRESSION, UNSPECIFIED DEPRESSION TYPE: ICD-10-CM

## 2019-05-06 RX ORDER — OXYCODONE HYDROCHLORIDE 5 MG/1
5 TABLET ORAL EVERY 4 HOURS PRN
Qty: 60 TABLET | Refills: 0 | OUTPATIENT
Start: 2019-05-06 | End: 2019-05-16

## 2019-05-06 RX ORDER — SERTRALINE HYDROCHLORIDE 50 MG/1
TABLET, FILM COATED ORAL
Qty: 30 TABLET | Refills: 3 | OUTPATIENT
Start: 2019-05-06

## 2019-05-06 RX ORDER — ALBUTEROL SULFATE 90 UG/1
2 AEROSOL, METERED RESPIRATORY (INHALATION) EVERY 6 HOURS PRN
Qty: 18 G | Refills: 0 | OUTPATIENT
Start: 2019-05-06 | End: 2020-05-05

## 2019-05-09 ENCOUNTER — OFFICE VISIT (OUTPATIENT)
Dept: ORTHOPEDICS | Facility: CLINIC | Age: 54
End: 2019-05-09
Payer: COMMERCIAL

## 2019-05-09 ENCOUNTER — OFFICE VISIT (OUTPATIENT)
Dept: INFECTIOUS DISEASES | Facility: CLINIC | Age: 54
End: 2019-05-09
Payer: COMMERCIAL

## 2019-05-09 ENCOUNTER — INFUSION (OUTPATIENT)
Dept: INFECTIOUS DISEASES | Facility: HOSPITAL | Age: 54
End: 2019-05-09
Attending: INTERNAL MEDICINE
Payer: COMMERCIAL

## 2019-05-09 VITALS
WEIGHT: 226.44 LBS | HEIGHT: 64 IN | DIASTOLIC BLOOD PRESSURE: 75 MMHG | BODY MASS INDEX: 38.66 KG/M2 | TEMPERATURE: 99 F | HEART RATE: 78 BPM | SYSTOLIC BLOOD PRESSURE: 124 MMHG

## 2019-05-09 VITALS
WEIGHT: 226.44 LBS | BODY MASS INDEX: 38.66 KG/M2 | HEIGHT: 64 IN | DIASTOLIC BLOOD PRESSURE: 71 MMHG | SYSTOLIC BLOOD PRESSURE: 105 MMHG | HEART RATE: 108 BPM

## 2019-05-09 DIAGNOSIS — Z98.890 POST-OPERATIVE STATE: ICD-10-CM

## 2019-05-09 DIAGNOSIS — L03.114 CELLULITIS OF LEFT UPPER EXTREMITY: Primary | ICD-10-CM

## 2019-05-09 DIAGNOSIS — M65.9 TENOSYNOVITIS: Primary | ICD-10-CM

## 2019-05-09 PROCEDURE — 99213 OFFICE O/P EST LOW 20 MIN: CPT | Mod: S$GLB,,, | Performed by: INTERNAL MEDICINE

## 2019-05-09 PROCEDURE — 99213 PR OFFICE/OUTPT VISIT, EST, LEVL III, 20-29 MIN: ICD-10-PCS | Mod: S$GLB,,, | Performed by: INTERNAL MEDICINE

## 2019-05-09 PROCEDURE — 99999 PR PBB SHADOW E&M-EST. PATIENT-LVL III: ICD-10-PCS | Mod: PBBFAC,,, | Performed by: INTERNAL MEDICINE

## 2019-05-09 PROCEDURE — 99999 PR PBB SHADOW E&M-EST. PATIENT-LVL III: CPT | Mod: PBBFAC,,, | Performed by: INTERNAL MEDICINE

## 2019-05-09 PROCEDURE — 99024 POSTOP FOLLOW-UP VISIT: CPT | Mod: S$GLB,,, | Performed by: PHYSICIAN ASSISTANT

## 2019-05-09 PROCEDURE — 99024 PR POST-OP FOLLOW-UP VISIT: ICD-10-PCS | Mod: S$GLB,,, | Performed by: PHYSICIAN ASSISTANT

## 2019-05-09 PROCEDURE — 99999 PR PBB SHADOW E&M-EST. PATIENT-LVL III: CPT | Mod: PBBFAC,,, | Performed by: PHYSICIAN ASSISTANT

## 2019-05-09 PROCEDURE — 3008F BODY MASS INDEX DOCD: CPT | Mod: CPTII,S$GLB,, | Performed by: INTERNAL MEDICINE

## 2019-05-09 PROCEDURE — 3008F PR BODY MASS INDEX (BMI) DOCUMENTED: ICD-10-PCS | Mod: CPTII,S$GLB,, | Performed by: INTERNAL MEDICINE

## 2019-05-09 PROCEDURE — 99999 PR PBB SHADOW E&M-EST. PATIENT-LVL III: ICD-10-PCS | Mod: PBBFAC,,, | Performed by: PHYSICIAN ASSISTANT

## 2019-05-09 RX ORDER — OXYCODONE HYDROCHLORIDE 5 MG/1
5 TABLET ORAL EVERY 4 HOURS PRN
COMMUNITY
End: 2020-02-15

## 2019-05-09 RX ORDER — AMOXICILLIN AND CLAVULANATE POTASSIUM 875; 125 MG/1; MG/1
1 TABLET, FILM COATED ORAL 2 TIMES DAILY
Qty: 28 TABLET | Refills: 0 | Status: SHIPPED | OUTPATIENT
Start: 2019-05-09 | End: 2019-05-23

## 2019-05-09 NOTE — PROGRESS NOTES
"Ms. Rodriguez is here today for a post-operative visit.  She is 18 days status post Irrigation and debridement, left hand and thumb, Carpal tunnel release, left wrist, A1 pulley release, left thumb. by Dr. Martínez on 4/21/19. She had sustained a cat bite to the hand two days prior to surgery. She presented to the ED with signs and symptoms consistent with flexor tenosynovitis.  An MRI was performed showing a large fluid collection within the carpal tunnel as well as the flexor tendon sheath of the thumb.   She reports that she is doing well. She was initially on IV abx which she has completed, she was seen by Infectious Disease today and transitioned to oral Augmentin x 14 days. Cultures obtained during surgery negative. Pain is 5/10.  She is taking pain medication as needed.  She denies fever, chills, and sweats since the time of the surgery.     Physical exam:    Vitals:    05/09/19 1308   BP: 105/71   Pulse: 108   Weight: 102.7 kg (226 lb 6.6 oz)   Height: 5' 4" (1.626 m)   PainSc:   5     Vital signs are stable, patient is afebrile.  Patient is well dressed and well groomed, no acute distress.  Alert and oriented to person, place, and time.  Incision is clean, dry and intact.  There is no erythema or exudate.  There is no sign of any infection. She is NVI. Sutures removed without difficulty.  She has fair wrist/finger motion.     Assessment: status post Irrigation and debridement, left hand and thumb, Carpal tunnel release, left wrist, A1 pulley release, left thumb. by Dr. Martínez on 4/21/19    Plan:  Geneva was seen today for pain and post-op evaluation.    Diagnoses and all orders for this visit:    Cellulitis of left upper extremity  -     Ambulatory Referral to Physical/Occupational Therapy    Post-operative state  -     Ambulatory Referral to Physical/Occupational Therapy    - PO instruction reviewed and provided to patient  -OT ordered  -left gel brace given   -Tylenol #3 prescription given #25  -RTC 1-2 " wks, pt to call if any worsening of symptoms/ signs of infection       Carolina Kitchen PA-C  [facility]   [facility] [place]

## 2019-05-09 NOTE — PROGRESS NOTES
Subjective:      Patient ID: Geneva Rodriguez is a 53 y.o. female.    Chief Complaint:Follow-up      History of Present Illness    Case of 54 y/o female with no significant PMHx, was admitted on 4/21/19 after had a penetrating cat bite to left hand. 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 After the injury patient developed pain and swelling of left hand. She was admitted MRI significant for tenosynovitis of flexor tendons. I+D was performed by ortho on 4/21/19 describing large amounts of purulent drainage in tendon sheath. Cultures from OR did not grow any organisms. Patient was sent home  On ceftriaxone for 2 week course EOT 5/5/19. Patient here today for f/u. Denies fevers, chills, diarrhea. Has had marked improvement of pain and swelling of affected hand but continues to have some tenderness. Has appointment later in the afternoon with ortho for possible suture removal.      Review of Systems   Constitution: Positive for malaise/fatigue. Negative for chills, decreased appetite, fever, night sweats, weight gain and weight loss.   HENT: Positive for congestion. Negative for ear pain, hearing loss, hoarse voice, sore throat and tinnitus.    Eyes: Negative for blurred vision, redness and visual disturbance.   Cardiovascular: Negative for chest pain, leg swelling and palpitations.   Respiratory: Positive for cough, sputum production and wheezing. Negative for hemoptysis and shortness of breath.    Hematologic/Lymphatic: Negative for adenopathy. Does not bruise/bleed easily.   Skin: Positive for itching. Negative for dry skin, rash and suspicious lesions.   Musculoskeletal: Positive for back pain, joint pain, myalgias and neck pain.   Gastrointestinal: Positive for heartburn. Negative for abdominal pain, constipation, diarrhea, nausea and vomiting.   Genitourinary: Negative for dysuria, flank pain, frequency, hematuria, hesitancy and urgency.   Neurological: Positive for dizziness and  paresthesias. Negative for headaches, numbness and weakness.   Psychiatric/Behavioral: Positive for memory loss. Negative for depression. The patient is nervous/anxious. The patient does not have insomnia.      Objective:   Physical Exam   Constitutional: She is oriented to person, place, and time. She appears well-developed and well-nourished.   HENT:   Head: Normocephalic and atraumatic.   Eyes: Pupils are equal, round, and reactive to light. EOM are normal.   Neck: Normal range of motion.   Cardiovascular: Normal rate, regular rhythm and normal heart sounds.   Pulmonary/Chest: Effort normal and breath sounds normal.   Abdominal: Soft. Bowel sounds are normal.   Musculoskeletal: Normal range of motion. She exhibits no edema.   Neurological: She is alert and oriented to person, place, and time.   Skin: No rash noted.         Assessment:       1. Tenosynovitis        54 y/o female with tenosynovitis of left hand 2ry to cat bite. Cultures with no isolated organisms differential includes P multocida, Capnocytophaga, Strep and Staph sp. Has completed 2 weeks of IV ceftriaxone because continues to have some tenderness at site will recommend to complete 2 weeks of oral augmentin and follow up in ID clinic at that time. Will remove PICC line today.   Plan:       Tenosynovitis  -     Nursing communication: PICC line removal   -     amoxicillin-clavulanate 875-125mg (AUGMENTIN) 875-125 mg per tablet; Take 1 tablet by mouth 2 (two) times daily. for 14 days    -     RTC in 2 weeks

## 2019-05-09 NOTE — PROGRESS NOTES
Geneva Rodriguez here today for PICC line removal. PICC removed per MD order and facility protocol. PICC catheter tip visualized and intact. Petroleum gauze dressing and 2x2 gauze applied and secured with coban. No redness, tenderness, edema, or drainage noted at site. Patient monitored for 30 minutes post removal. Instructions provided on post PICC discharge care, including follow up notification instructions. Patient discharged in no apparent distress.

## 2019-05-09 NOTE — PROGRESS NOTES
I have reviewed the notes, assessments, and/or procedures performed by Dr. Weber, I concur with her/his documentation of Geneva Rodriguez.

## 2019-05-09 NOTE — PLAN OF CARE
Problem: Adult Inpatient Plan of Care  Goal: Patient-Specific Goal (Individualization)  Outcome: Ongoing (interventions implemented as appropriate)  Patient education given on hygiene, pain management, and medication/treatment plan. The patient expresses understanding and acceptance of instructions. Laura Lynn 5/9/2019 9:43 AM

## 2019-05-14 NOTE — PROGRESS NOTES
[facility] Occupational Therapy  Initial Evaluation     Date: 5/16/2019  Patient: Geneva Rodriguez  Chart Number: [id number]    Therapy Diagnosis: s/p I and D, L CTR, L thumb A1 pulley release  Physician: Sunni Molina, *    Physician Orders: Eval and treat  Medical Diagnosis:   L03.114 (ICD-10-CM) - Cellulitis of left upper extremity   Z98.890 (ICD-10-CM) - Post-operative state       Evaluation Date: 5/16/2019  Insurance Authorization period Expiration: 5/8/2020  Plan of Care Expiration Period: 6/27/19    Visit # / Visits Authortized: 1 / 1  Time In:3:00 pm  Time Out: 3:50 pm  Total Billable Time: 50 minutes    Precautions: Standard   Date of Surgery: 4/21/19   S/P: 3 weeks 4 days    Subjective     Involved Side: Left  Dominant Side: Right  Date of Onset: 4/19/19  Mechanism of Injury/ History of Current Condition: Pt sustained a catbite to her L thumb with resulting infection in 1st web, acute CTS with abcess. I and D performed 2 days later. Completed course of IV antibiotics and now on oral antibiotics. Cultures were negative. Sutures removed 5/9/19.   Surgical Procedure: I and D, L CTR, L A1 pulley release  Imaging: MRI studies:   There is tenosynovitis at the level of the flexor tendon sheath.  Postcontrast images demonstrate enhancement at the level of the synovial sheath.  Inflammatory/infectious tenosynovitis must be a consideration.  Extensor tendons are unremarkable as visualized.  There is no focal abscess demonstrated.  Fluid/edema is also identified extending volar to the adductor pollicis brevis.  Increased signal intensity at level of flexor pollicis longus.  The osseous structures demonstrate no evident abnormality.      Impression       Tenosynovitis of flexor tendons, likely in failure at axis or inflammatory with edema extending surrounding the adductor pollicis.  No focal abscess demonstrated.  Subcutaneous edema extending along the RIGHT thumb to the level of the distal  phalanx.  Increased signal intensity at the level of the flexor pollicis longus may reflect tendinosis or inflammatory change.      X rays: No acute fracture or dislocation.  Soft tissues are symmetric.  No radiopaque foreign body.  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     Current FunctionalStatus   Home/Living environment : lives with their daughter      Limitation of Functional Status as follows:   ADLs/IADLs:     - Feeding: difficulty cutting food    - Bathing: difficulty washing hair, washing R hand    - Dressing/Grooming: managing fasteners, pulling up pants    - Driving: using R hand     Leisure: n/a       Past Medical History/Physical Systems Review:   Geneva Rodriguez  has a past medical history of Ruptured spleen.    Geneva Rodriguez  has a past surgical history that includes Incision and drainage (Left, 4/21/2019); Carpal tunnel release (Left, 4/21/2019); and Trigger finger release (4/21/2019).    Geneva has a current medication list which includes the following prescription(s): acetaminophen, acetaminophen-codeine 300-30mg, albuterol, amoxicillin-clavulanate 875-125mg, celecoxib, oxycodone, polyethylene glycol, pregabalin, senna, and sertraline.    Review of patient's allergies indicates:  No Known Allergies       Objective     Observation/Inspection:  Presents wearing prefab wrist brace.     Sensation:  Intact to light touch. Hypersensitivity reported where skin is  healing Paresthesias reported, numbness is improving.    Scar Minimal tenderness to palpation. Scar is  mildl  thickened and raised, with mild -mod adherence. All healthy in appearance with no S/S of infection observed.          Edema:            (in cm) L R   Proximal Wrist Crease 15.5 15.9   MPs 17.9 19.2   Thumb Proximal Phalanx 6.4 6.2         Range of Motion:  Full fist formation but painful and labored and difficult at end range.      Wrist E/F L 55/61  R  51/65   Wrist RD/UD L 20/25 R 25/35   Thumb R/P Abd L 57/45 R 54/55   Thumb MP flex L 0/35 R 0/52   Thumb IP flex L 0+/55 R 0+/74   Thumb Opp L 2.5  cm R  0 cm                           Manual Muscle Test:  Not tested                                       and Pinch Strength: not tested at this time due to post operative status.      Special Tests: none performed         CMS Impairment/Limitation/Restriction for Quick DASH Survey    Therapist reviewed Quick DASH scores for Geneva Rodriguez on 5/16/2019.   Quick DASH documents entered into "[employer] - see Media section.    The Quick DASH Questionnaire- The following scores are based on patient reported assessment at the time of the initial occupational therapy evaluation:    Activity:  1. Open a tight jar: severe Difficulty  2. Do heavy household chores: moderate Difficulty  3. Carry a shopping bag or briefcase: severe Difficulty  4. Wash your back: severe Difficulty  5.Use a knife to cut food:moderate Difficulty  6.. Recreational activities requiring force through arm: severe Difficulty    7. Social Limitation: moderately Limited  8. Work/ADL Limitation: moderately  Limited    Severity of Symptoms (over the past week)  9. Pain: moderate  10. Tingling: severe    11. Sleeping Limitation:  moderate Difficulty    Limitation Score: 61%           Treatment     Treatment Time In: 3:25 pm  Treatment Time Out: 3:50 pm  Total Treatment time separate from Evaluation time:25 min    Geneva received the following supervised  modalities after being cleared for contradictions for 5 minutes:   -Cold pack x 5 minutes post treatment to decrease pain/inflammation and edema.          Geneva received the following manual therapy techniques for 3 minutes:   -Retrograde massage to L digits/hand/wrist x 3 min to stimulate lymphatics to decrease pain,  edema and increase AROM and functional use.   -education on scar massage to tolerance as skin is less fragile      Geneva received therapeutic exercises for 17 minutes including:  -FA rotation, wrist AROM 2 ways, thumb circles/reverse, IP/MPJ blocks, composite thumb flexion, R/P abd, opposition,   -spreads, lifts, wave, hook, fist, straight fist  -median nerve glide 7 reps to step 3    Issued tubigrip to reduce edema as pt had bulky ace wrap under orthosis.   Home Exercise Program/Education:  Issued HEP (see patient instructions in EMR) and educated on modality use for pain management . Exercises were reviewed and Geneva was able to demonstrate them prior to the end of the session.   Pt received a written copy of exercises to perform at home. Geneva demonstrated good  understanding of the education provided.  Pt was advised to perform these exercises free of pain, and to stop performing them if pain occurs.    Patient/Family Education: role of OT, goals for OT, scheduling/cancellations - pt verbalized understanding. Discussed insurance limitations with patient.    Additional Education provided: use of heat and cold,     Assessment     Geneva Rodriguez is a 53 y.o. female referred to outpatient occupational/hand therapy and presents with a medical diagnosis of L thumb, hand, and wrist cellulitis from catbite, 3 weeks s/p I and D, L CTR, and L thumb A1 pulley release, resulting in Paresthesias, pain, edema, decreased A/PROM, strength, and functional use of L UE and demonstrates limitations as described in the chart below. Pain with straight fist, median nerve glide.     The patient's rehab potential  is Good.     Anticipated barriers to occupational therapy: none  Pt has no cultural, educational or language barriers to learning provided.    Profile and History Assessment of Occupational Performance Level of Clinical Decision Making Complexity Score   Occupational Profile:   Geneva Rodriguez is a 53 y.o. female who lives with their daughter and is currently employed as respiratory therapist. Geneva Rodriguez has difficulty with  feeding, bathing, grooming and dressing  shopping, housework, driving, work duties  affecting his/her daily functional abilities. His/her main goal for therapy is RTW full time.     Comorbidities:   none    Medical and Therapy History Review:   Expanded               Performance Deficits    Physical:  Joint Mobility  Muscle Power/Strength  Muscle Endurance  Skin Integrity/Scar Formation  Edema   Strength  Pinch Strength  Fine Motor Coordination  Pain  paresthesias    Cognitive:  No Deficits    Psychosocial:    No Deficits     Clinical Decision Making:  low    Assessment Process:  Detailed Assessments    Modification/Need for Assistance:  Minimal to Mod    Intervention Selection:  Multiple Treatment Options       moderate  Based on PMHX, co morbidities , data from assessments and functional level of assistance required with task and clinical presentation directly impacting function.       The following goals were discussed with the patient and patient is in agreement with them as to be addressed in the treatment plan.     Goals:     Short Term Goals: (4 weeks)  1. Pt will be independent with HEP in 2 visits.  2. Pt will report decreased pain to a 3-4/10 with ADLs.  3. Pt will  increase wrist  AROM by 5-10 degrees to enable dressing, grooming activities.  4. Pt will increase thumb MOORE by 10-20 degrees to enable manipulation of small objects.    Long Term Goals: (by discharge)  1. Pt will report decreased pain to 1-2/10 with ADLs.   2. Pt will exhibit increased MOORE L thumb by 30-35 degrees  to enable independence with using work tools.    3. Pt will exhibit L   strength at 60-75% of R comparative measures to allow a firm grasp on cooking utensils, steering wheel, etc.  4. Pt will exhibit 7+# of functional pinch strength to allow writing,opening containers, and turning keys.  5. Pt will exhibit a significant increase (30-40 points) in the Quick DASH assessment.  6. Pt will return to PLOF.   7. Scars will be non-tender and non-limiting functionally.     Plan   Certification Period/Plan of care expiration: 5/16/2019 to 6/27/19.    Outpatient Occupational Therapy 2 times weekly for 6 weeks to include the following interventions:     Modalities to decrease pain (moist heat, paraffin, fluidotherapy, US, ), edema control, scar mobilization, manual therapy/joint mobilizations,A/PROM, therapeutic exercises/activities, strengthening, desensitization/sensory re-education, orthotic fitting/fabrication/training PRN,  HEP/education as well as any other treatments deemed necessary based on the patient's needs or progress.        ELHAM Lassiter, ALDAIRT

## 2019-05-15 ENCOUNTER — PATIENT MESSAGE (OUTPATIENT)
Dept: INTERNAL MEDICINE | Facility: CLINIC | Age: 54
End: 2019-05-15

## 2019-05-16 ENCOUNTER — PATIENT MESSAGE (OUTPATIENT)
Dept: INTERNAL MEDICINE | Facility: CLINIC | Age: 54
End: 2019-05-16

## 2019-05-16 ENCOUNTER — CLINICAL SUPPORT (OUTPATIENT)
Dept: REHABILITATION | Facility: HOSPITAL | Age: 54
End: 2019-05-16
Attending: ORTHOPAEDIC SURGERY
Payer: COMMERCIAL

## 2019-05-16 DIAGNOSIS — Z98.890 POST-OPERATIVE STATE: ICD-10-CM

## 2019-05-16 DIAGNOSIS — L03.114 CELLULITIS OF LEFT UPPER EXTREMITY: ICD-10-CM

## 2019-05-16 DIAGNOSIS — F32.A DEPRESSION, UNSPECIFIED DEPRESSION TYPE: ICD-10-CM

## 2019-05-16 PROCEDURE — 97110 THERAPEUTIC EXERCISES: CPT | Mod: PO

## 2019-05-16 PROCEDURE — 97165 OT EVAL LOW COMPLEX 30 MIN: CPT | Mod: PO

## 2019-05-16 RX ORDER — ALBUTEROL SULFATE 90 UG/1
2 AEROSOL, METERED RESPIRATORY (INHALATION) EVERY 6 HOURS PRN
Qty: 8.5 G | Refills: 0 | Status: SHIPPED | OUTPATIENT
Start: 2019-05-16 | End: 2019-07-03 | Stop reason: SDUPTHER

## 2019-05-16 RX ORDER — SERTRALINE HYDROCHLORIDE 50 MG/1
50 TABLET, FILM COATED ORAL DAILY
Qty: 90 TABLET | Refills: 0 | Status: SHIPPED | OUTPATIENT
Start: 2019-05-16 | End: 2019-09-13

## 2019-05-20 LAB — FUNGUS SPEC CULT: NORMAL

## 2019-05-22 ENCOUNTER — OFFICE VISIT (OUTPATIENT)
Dept: ORTHOPEDICS | Facility: CLINIC | Age: 54
End: 2019-05-22
Payer: COMMERCIAL

## 2019-05-22 VITALS
WEIGHT: 226 LBS | BODY MASS INDEX: 38.58 KG/M2 | HEIGHT: 64 IN | SYSTOLIC BLOOD PRESSURE: 102 MMHG | DIASTOLIC BLOOD PRESSURE: 64 MMHG | HEART RATE: 83 BPM

## 2019-05-22 DIAGNOSIS — Z98.890 POST-OPERATIVE STATE: Primary | ICD-10-CM

## 2019-05-22 PROCEDURE — 99024 PR POST-OP FOLLOW-UP VISIT: ICD-10-PCS | Mod: S$GLB,,, | Performed by: PHYSICIAN ASSISTANT

## 2019-05-22 PROCEDURE — 99024 POSTOP FOLLOW-UP VISIT: CPT | Mod: S$GLB,,, | Performed by: PHYSICIAN ASSISTANT

## 2019-05-22 PROCEDURE — 99999 PR PBB SHADOW E&M-EST. PATIENT-LVL III: ICD-10-PCS | Mod: PBBFAC,,, | Performed by: PHYSICIAN ASSISTANT

## 2019-05-22 PROCEDURE — 99999 PR PBB SHADOW E&M-EST. PATIENT-LVL III: CPT | Mod: PBBFAC,,, | Performed by: PHYSICIAN ASSISTANT

## 2019-05-22 NOTE — PROGRESS NOTES
"Ms. Rodriguez is here today for a post-operative visit.  She is 4 weeks status post Irrigation and debridement, left hand and thumb, Carpal tunnel release, left wrist, A1 pulley release, left thumb. by Dr. Martínez on 4/21/19. She had sustained a cat bite to the hand two days prior to surgery. She presented to the ED with signs and symptoms consistent with flexor tenosynovitis.  An MRI was performed showing a large fluid collection within the carpal tunnel as well as the flexor tendon sheath of the thumb.   She reports that she is doing well. Pt began therapy. She completed oral abx. Cultures obtained during surgery negative. Pain is minimal, she has mild incisional soreness.  She is taking Tylenol as needed.  She denies fever, chills, and sweats since the time of the surgery.     Physical exam:    Vitals:    05/22/19 1438   BP: 102/64   Pulse: 83   Weight: 102.5 kg (226 lb)   Height: 5' 4" (1.626 m)   PainSc:   1     Vital signs are stable, patient is afebrile.  Patient is well dressed and well groomed, no acute distress.  Alert and oriented to person, place, and time.  Incision is clean, dry and intact, well healed, small eschar present.  There is no erythema or exudate.  There is no sign of any infection. She is NVI.  She has good finger/ wrist motion.     Assessment: status post Irrigation and debridement, left hand and thumb, Carpal tunnel release, left wrist, A1 pulley release, left thumb. by Dr. Martínez on 4/21/19    Plan:  Geneva was seen today for pain.    Diagnoses and all orders for this visit:    Post-operative state       - PO instruction reviewed and provided to patient  -continue OT   -work letter given   -RTC 2 wks if needed       Carolina Kitchen PA-C  [facility]   [facility] [place]        "

## 2019-05-22 NOTE — LETTER
[facility] [address] [facility] [address]  [address], [address]  [facility] [address]  Phone: [phone]     05/22/2019    To Whom It May Concern,     Geneva Rodriguez is status post surgery on the left hand by Dr. Martínez on 4/21/19. She may return to work on 5/32/19 with no restrictions.     If you have questions, please call.     Sincerely,         Carolina Kitchen PA-C  [facility]   [facility]  [place], [place]

## 2019-05-23 ENCOUNTER — PATIENT MESSAGE (OUTPATIENT)
Dept: ORTHOPEDICS | Facility: CLINIC | Age: 54
End: 2019-05-23

## 2019-05-23 ENCOUNTER — TELEPHONE (OUTPATIENT)
Dept: ORTHOPEDICS | Facility: CLINIC | Age: 54
End: 2019-05-23

## 2019-05-23 NOTE — TELEPHONE ENCOUNTER
----- Message from Eloisa Schmidt sent at 5/23/2019  8:40 AM CDT -----  Contact: Self   Name of Who is Calling: LIVIA SIDHU [[id number]]    What is the request in detail: Pt states that her return to work form says 5/32/19, and she needs it corrected to 5/23/19. Please fax to [phone]. Please contact to further discuss and advise.      Can the clinic reply by [facility]: N    [facility]: [phone]

## 2019-06-23 LAB
ACID FAST MOD KINY STN SPEC: NORMAL
MYCOBACTERIUM SPEC QL CULT: NORMAL

## 2019-07-03 RX ORDER — ALBUTEROL SULFATE 90 UG/1
2 AEROSOL, METERED RESPIRATORY (INHALATION) EVERY 6 HOURS PRN
Qty: 8.5 G | Refills: 0 | Status: SHIPPED | OUTPATIENT
Start: 2019-07-03 | End: 2019-09-20

## 2019-09-06 DIAGNOSIS — F32.A DEPRESSION, UNSPECIFIED DEPRESSION TYPE: ICD-10-CM

## 2019-09-09 RX ORDER — ALBUTEROL SULFATE 90 UG/1
2 AEROSOL, METERED RESPIRATORY (INHALATION) EVERY 6 HOURS PRN
Qty: 8.5 G | Refills: 0 | OUTPATIENT
Start: 2019-09-09 | End: 2020-09-08

## 2019-09-09 RX ORDER — SERTRALINE HYDROCHLORIDE 50 MG/1
50 TABLET, FILM COATED ORAL DAILY
Qty: 90 TABLET | Refills: 0 | OUTPATIENT
Start: 2019-09-09

## 2019-09-13 ENCOUNTER — TELEPHONE (OUTPATIENT)
Dept: INTERNAL MEDICINE | Facility: CLINIC | Age: 54
End: 2019-09-13

## 2019-09-13 DIAGNOSIS — F32.A DEPRESSION, UNSPECIFIED DEPRESSION TYPE: ICD-10-CM

## 2019-09-16 RX ORDER — SERTRALINE HYDROCHLORIDE 50 MG/1
50 TABLET, FILM COATED ORAL DAILY
Qty: 90 TABLET | Refills: 0 | Status: SHIPPED | OUTPATIENT
Start: 2019-09-16

## 2019-09-16 NOTE — TELEPHONE ENCOUNTER
Please call patient.  I refilled her zoloft but she needs an appointment to establish care with a PCP for further refills.

## 2019-09-23 RX ORDER — ALBUTEROL SULFATE 90 UG/1
2 AEROSOL, METERED RESPIRATORY (INHALATION) EVERY 6 HOURS PRN
Qty: 8.5 G | Refills: 0 | Status: SHIPPED | OUTPATIENT
Start: 2019-09-23 | End: 2020-09-22

## 2020-02-15 ENCOUNTER — HOSPITAL ENCOUNTER (EMERGENCY)
Facility: HOSPITAL | Age: 55
Discharge: HOME | End: 2020-02-15
Attending: EMERGENCY MEDICINE
Payer: COMMERCIAL

## 2020-02-15 VITALS
HEIGHT: 64 IN | DIASTOLIC BLOOD PRESSURE: 61 MMHG | WEIGHT: 230 LBS | TEMPERATURE: 98 F | BODY MASS INDEX: 39.27 KG/M2 | HEART RATE: 71 BPM | SYSTOLIC BLOOD PRESSURE: 135 MMHG | OXYGEN SATURATION: 97 % | RESPIRATION RATE: 15 BRPM

## 2020-02-15 DIAGNOSIS — K80.20 CHOLELITHIASIS WITHOUT CHOLECYSTITIS: ICD-10-CM

## 2020-02-15 DIAGNOSIS — R05.9 COUGH: ICD-10-CM

## 2020-02-15 DIAGNOSIS — M54.9 MUSCULOSKELETAL BACK PAIN: Primary | ICD-10-CM

## 2020-02-15 LAB
ANION GAP SERPL CALC-SCNC: 10 MMOL/L (ref 8–16)
BILIRUB UR QL STRIP: NEGATIVE
BUN SERPL-MCNC: 11 MG/DL (ref 6–20)
CALCIUM SERPL-MCNC: 9.1 MG/DL (ref 8.7–10.5)
CHLORIDE SERPL-SCNC: 107 MMOL/L (ref 95–110)
CLARITY UR REFRACT.AUTO: ABNORMAL
CO2 SERPL-SCNC: 22 MMOL/L (ref 23–29)
COLOR UR AUTO: ABNORMAL
CREAT SERPL-MCNC: 0.8 MG/DL (ref 0.5–1.4)
EST. GFR  (AFRICAN AMERICAN): >60 ML/MIN/1.73 M^2
EST. GFR  (NON AFRICAN AMERICAN): >60 ML/MIN/1.73 M^2
GLUCOSE SERPL-MCNC: 119 MG/DL (ref 70–110)
GLUCOSE UR QL STRIP: NEGATIVE
HGB UR QL STRIP: NEGATIVE
KETONES UR QL STRIP: ABNORMAL
LEUKOCYTE ESTERASE UR QL STRIP: NEGATIVE
MICROSCOPIC COMMENT: ABNORMAL
NITRITE UR QL STRIP: NEGATIVE
PH UR STRIP: 5 [PH] (ref 5–8)
POTASSIUM SERPL-SCNC: 3.9 MMOL/L (ref 3.5–5.1)
PROT UR QL STRIP: NEGATIVE
RBC #/AREA URNS AUTO: 6 /HPF (ref 0–4)
SODIUM SERPL-SCNC: 139 MMOL/L (ref 136–145)
SP GR UR STRIP: 1.03 (ref 1–1.03)
SQUAMOUS #/AREA URNS AUTO: 1 /HPF
URN SPEC COLLECT METH UR: ABNORMAL
WBC #/AREA URNS AUTO: 2 /HPF (ref 0–5)

## 2020-02-15 PROCEDURE — 99284 EMERGENCY DEPT VISIT MOD MDM: CPT | Mod: 25

## 2020-02-15 PROCEDURE — 25000003 PHARM REV CODE 250: Performed by: EMERGENCY MEDICINE

## 2020-02-15 PROCEDURE — 96374 THER/PROPH/DIAG INJ IV PUSH: CPT

## 2020-02-15 PROCEDURE — 99285 PR EMERGENCY DEPT VISIT,LEVEL V: ICD-10-PCS | Mod: ,,, | Performed by: EMERGENCY MEDICINE

## 2020-02-15 PROCEDURE — 80048 BASIC METABOLIC PNL TOTAL CA: CPT

## 2020-02-15 PROCEDURE — 99285 EMERGENCY DEPT VISIT HI MDM: CPT | Mod: ,,, | Performed by: EMERGENCY MEDICINE

## 2020-02-15 PROCEDURE — 63600175 PHARM REV CODE 636 W HCPCS: Performed by: EMERGENCY MEDICINE

## 2020-02-15 PROCEDURE — 81001 URINALYSIS AUTO W/SCOPE: CPT

## 2020-02-15 RX ORDER — IBUPROFEN 600 MG/1
600 TABLET ORAL EVERY 6 HOURS PRN
Qty: 20 TABLET | Refills: 0 | Status: SHIPPED | OUTPATIENT
Start: 2020-02-15

## 2020-02-15 RX ORDER — HYDROCODONE BITARTRATE AND ACETAMINOPHEN 5; 325 MG/1; MG/1
1 TABLET ORAL
Status: COMPLETED | OUTPATIENT
Start: 2020-02-15 | End: 2020-02-15

## 2020-02-15 RX ORDER — KETOROLAC TROMETHAMINE 30 MG/ML
10 INJECTION, SOLUTION INTRAMUSCULAR; INTRAVENOUS
Status: COMPLETED | OUTPATIENT
Start: 2020-02-15 | End: 2020-02-15

## 2020-02-15 RX ORDER — HYDROCODONE BITARTRATE AND ACETAMINOPHEN 5; 325 MG/1; MG/1
1 TABLET ORAL EVERY 6 HOURS PRN
Qty: 12 TABLET | Refills: 0 | Status: SHIPPED | OUTPATIENT
Start: 2020-02-15

## 2020-02-15 RX ORDER — CYCLOBENZAPRINE HCL 10 MG
10 TABLET ORAL 3 TIMES DAILY PRN
Qty: 15 TABLET | Refills: 0 | Status: SHIPPED | OUTPATIENT
Start: 2020-02-15 | End: 2020-02-20

## 2020-02-15 RX ADMIN — HYDROCODONE BITARTRATE AND ACETAMINOPHEN 1 TABLET: 5; 325 TABLET ORAL at 09:02

## 2020-02-15 RX ADMIN — KETOROLAC TROMETHAMINE 10 MG: 30 INJECTION, SOLUTION INTRAMUSCULAR; INTRAVENOUS at 07:02

## 2020-02-16 NOTE — ED PROVIDER NOTES
Encounter Date: 2/15/2020    SCRIBE #1 NOTE: I, Vivek Nichole, am scribing for, and in the presence of,  Dr. Jara. I have scribed the following portions of the note - Other sections scribed: HPI, ROS, PE.       History     Chief Complaint   Patient presents with    Flank Pain      bilateral flank pain .     Patient is a 54 year old female with a ruptured spleen and gallstones, presenting with back pain for a few days. Has a history of leakage, UTI, kidney infections and difficulty urinating. Recently admitted for meningitis. Denies fevers, dysuria, weakness, numbness. Back pain located to the middle and lower regions. Notes a 'garlic' smell with her urine.     The history is provided by the patient and medical records.     Review of patient's allergies indicates:  No Known Allergies  Past Medical History:   Diagnosis Date    Ruptured spleen      Past Surgical History:   Procedure Laterality Date    CARPAL TUNNEL RELEASE Left 4/21/2019    Procedure: RELEASE, CARPAL TUNNEL;  Surgeon: Emiliano Martínez MD;  Location: [address];  Service: Orthopedics;  Laterality: Left;    INCISION AND DRAINAGE Left 4/21/2019    Procedure: Incision and Drainage Hand;  Surgeon: Emiliano Martínez MD;  Location: [address];  Service: Orthopedics;  Laterality: Left;    TRIGGER FINGER RELEASE  4/21/2019    Procedure: RELEASE, TRIGGER FINGER, THUMB;  Surgeon: Emiliano Martínez MD;  Location: [address];  Service: Orthopedics;;     History reviewed. No pertinent family history.  Social History     Tobacco Use    Smoking status: Never Smoker   Substance Use Topics    Alcohol use: Yes     Alcohol/week: 7.0 standard drinks     Types: 7 Cans of beer per week     Frequency: 2-3 times a week     Drinks per session: 1 or 2     Binge frequency: Less than monthly    Drug use: Not on file     Review of Systems   Constitutional: Negative for chills and fever.   HENT: Negative for sore throat and trouble swallowing.     Eyes: Negative for pain and redness.   Respiratory: Negative for cough and shortness of breath.    Cardiovascular: Negative for chest pain and palpitations.   Gastrointestinal: Negative for nausea and vomiting.   Genitourinary: Positive for flank pain. Negative for dysuria.   Musculoskeletal: Positive for back pain. Negative for neck pain.   Skin: Negative for rash and wound.   Neurological: Negative for weakness and numbness.   Psychiatric/Behavioral: Negative for behavioral problems and confusion.       Physical Exam     Initial Vitals [02/15/20 1814]   BP Pulse Resp Temp SpO2   132/88 98 18 98.2 °F (36.8 °C) 96 %      MAP       --         Physical Exam    Nursing note and vitals reviewed.  Constitutional: She appears well-developed and well-nourished.   Nontoxic, well appearing female.    HENT:   Head: Normocephalic and atraumatic.   Eyes: EOM are normal. Pupils are equal, round, and reactive to light.   Neck: Neck supple.   Cardiovascular: Normal rate, regular rhythm, normal heart sounds and intact distal pulses. Exam reveals no gallop and no friction rub.    No murmur heard.  Pulmonary/Chest: No respiratory distress. She has no decreased breath sounds. She has wheezes (mild expiratory, lower field). She has no rhonchi. She has no rales.   Abdominal: Soft. Bowel sounds are normal. She exhibits no distension. There is no tenderness.   Flank tenderness bilaterally.   Musculoskeletal: Normal range of motion.   Neurological: She is alert and oriented to person, place, and time.   Skin: Skin is warm and dry.   Dermatitis.   Psychiatric: She has a normal mood and affect.         ED Course   Procedures  Labs Reviewed - No data to display       Imaging Results    None          Medical Decision Making:   History:   Old Medical Records: I decided to obtain old medical records.  Independently Interpreted Test(s):   I have ordered and independently interpreted X-rays - see prior notes.  Clinical Tests:   Lab Tests:  Ordered and Reviewed  Radiological Study: Ordered and Reviewed  Patient appears to have musculoskeletal back pain.  Chest x-ray is normal. Urinalysis shows no signs UTI.  Vital signs are stable. She can take anti-inflammatories muscle relaxers and I feel that she is stable for discharge at this time.  She is to follow up with primary care.  Return precautions have been given.    9:15 PM CT was ordered which shows cholelithiasis however the patient has no abdominal pain.  She is not tender in the right upper quadrant.  BNP was ordered originally.  Given that she does not have any significant abdominal pain or tenderness I do not think LFTs or necessary at this time.  She is aware that she has had cholelithiasis for years.  She has had intermittent pain in the right upper quadrant in the past and does not feel similar to that at this time.  She has no midepigastric pain to suggest pancreatitis.  I believe this is musculoskeletal back pain will give her short course of muscle axis anti-inflammatories pain medicine and she is stable for discharge to follow up with primary care.            Scribe Attestation:   Scribe #1: I performed the above scribed service and the documentation accurately describes the services I performed. I attest to the accuracy of the note.                          Clinical Impression:       ICD-10-CM ICD-9-CM   1. Musculoskeletal back pain M54.9 724.5   2. Cough R05 786.2   3. Cholelithiasis without cholecystitis K80.20 574.20                             Denver Jara MD  02/15/20 7200

## 2020-02-16 NOTE — ED NOTES
"Geneva Rodriguez, a 54 y.o. female presents to the ED w/ complaint of flank pain bilaterally, moreso on the right side x3-4 days. Movement and laying flat aggravates the pain. Dysuria intermittently, malodorous "like garlic." Pt reports midsternal chest discomfort and increased belching, nausea. Pt reports chills, sweats, hot flashes, premenopausal. Denies fevers, SOB, vomiting.    Triage note:  Chief Complaint   Patient presents with    Flank Pain      bilateral flank pain .     Review of patient's allergies indicates:  No Known Allergies  Past Medical History:   Diagnosis Date    Ruptured spleen      Adult Physical Assessment  LOC: Geneva Rodriguez, 54 y.o. female verified via two identifiers.  The patient is awake, alert, oriented and speaking appropriately at this time.  APPEARANCE: Patient resting comfortably and appears to be in no acute distress at this time. Patient is clean and well groomed, patient's clothing is properly fastened.  SKIN:The skin is warm and dry, color consistent with ethnicity, patient has normal skin turgor and moist mucus membranes, skin intact, no breakdown or brusing noted.  MUSCULOSKELETAL: Patient moving all extremities well, no obvious swelling or deformities noted. Bilateral flank pain x3-4 days  RESPIRATORY: Airway is open and patent, respirations are spontaneous, patient has a normal effort and rate, no accessory muscle use noted.  CARDIAC: Patient has a normal rate and rhythm, no periphreal edema noted in any extremity, capillary refill < 3 seconds in all extremities  ABDOMEN: Soft and non tender to palpation, no abdominal distention noted. Bowel sounds present in all four quadrants. No menstrual cycles in 10 years.  NEUROLOGIC: Eyes open spontaneously, behavior appropriate to situation, follows commands, facial expression symmetrical, bilateral hand grasp equal and even, purposeful motor response noted, normal sensation in all extremities when touched with a finger.    "

## 2020-04-21 DIAGNOSIS — Z01.84 ANTIBODY RESPONSE EXAMINATION: ICD-10-CM

## 2020-05-21 DIAGNOSIS — Z01.84 ANTIBODY RESPONSE EXAMINATION: ICD-10-CM

## 2020-06-20 DIAGNOSIS — Z01.84 ANTIBODY RESPONSE EXAMINATION: ICD-10-CM

## 2020-07-20 DIAGNOSIS — Z01.84 ANTIBODY RESPONSE EXAMINATION: ICD-10-CM

## 2020-08-19 DIAGNOSIS — Z01.84 ANTIBODY RESPONSE EXAMINATION: ICD-10-CM

## 2020-09-18 DIAGNOSIS — Z01.84 ANTIBODY RESPONSE EXAMINATION: ICD-10-CM

## 2020-10-18 DIAGNOSIS — Z01.84 ANTIBODY RESPONSE EXAMINATION: ICD-10-CM

## 2020-11-17 DIAGNOSIS — Z01.84 ANTIBODY RESPONSE EXAMINATION: ICD-10-CM

## 2024-02-22 NOTE — ASSESSMENT & PLAN NOTE
Cat bite, left thumb    - Appreciate orthopaedic's assistance.  - WBC 18.54, x-ray hand unremarkable.  - MRI pending.  - Patient started on Unasyn.  - NPO, keep elevated.     Negative

## (undated) DEVICE — GAUZE SPONGE 4X4 12PLY

## (undated) DEVICE — SEE MEDLINE ITEM 152515

## (undated) DEVICE — ELECTRODE REM PLYHSV RETURN 9

## (undated) DEVICE — CONTAINER SPECIMEN STRL 4OZ

## (undated) DEVICE — SLIDE STERILE FROSTED

## (undated) DEVICE — PENCIL ROCKER SWITCH 10FT CORD

## (undated) DEVICE — APPLICATOR CHLORAPREP ORN 26ML

## (undated) DEVICE — STOCKINET TUBULAR 1 PLY 6X60IN

## (undated) DEVICE — SEE MEDLINE ITEM 157131

## (undated) DEVICE — SPONGE LAP 18X18 PREWASHED

## (undated) DEVICE — SEE MEDLINE ITEM 146298

## (undated) DEVICE — TRAY MINOR ORTHO

## (undated) DEVICE — KIT COLLECTION E SWAB REGULAR

## (undated) DEVICE — CORD BIPOLAR 12 FOOT

## (undated) DEVICE — DRESSING XEROFORM FOIL PK 1X8

## (undated) DEVICE — PAD CAST SPECIALIST STRL 4

## (undated) DEVICE — BANDAGE ESMARK 6X12